# Patient Record
Sex: MALE | Race: BLACK OR AFRICAN AMERICAN | NOT HISPANIC OR LATINO | Employment: FULL TIME | URBAN - METROPOLITAN AREA
[De-identification: names, ages, dates, MRNs, and addresses within clinical notes are randomized per-mention and may not be internally consistent; named-entity substitution may affect disease eponyms.]

---

## 2024-05-18 ENCOUNTER — HOSPITAL ENCOUNTER (INPATIENT)
Facility: HOSPITAL | Age: 59
LOS: 1 days | Discharge: HOME | DRG: 687 | End: 2024-05-21
Attending: EMERGENCY MEDICINE | Admitting: INTERNAL MEDICINE
Payer: COMMERCIAL

## 2024-05-18 DIAGNOSIS — E87.1 HYPONATREMIA: ICD-10-CM

## 2024-05-18 DIAGNOSIS — R07.9 CHEST PAIN: ICD-10-CM

## 2024-05-18 DIAGNOSIS — R30.0 DYSURIA: ICD-10-CM

## 2024-05-18 DIAGNOSIS — N28.89 RENAL MASS, RIGHT: Primary | ICD-10-CM

## 2024-05-18 DIAGNOSIS — R31.9 URINARY TRACT INFECTION WITH HEMATURIA, SITE UNSPECIFIED: ICD-10-CM

## 2024-05-18 DIAGNOSIS — N39.0 URINARY TRACT INFECTION WITH HEMATURIA, SITE UNSPECIFIED: ICD-10-CM

## 2024-05-18 LAB
ALBUMIN SERPL-MCNC: 4 G/DL (ref 3.5–5)
ALBUMIN/GLOB SERPL: 1 RATIO (ref 1.1–2)
ALP SERPL-CCNC: 71 UNIT/L (ref 40–150)
ALT SERPL-CCNC: 25 UNIT/L (ref 0–55)
ANION GAP SERPL CALC-SCNC: 14 MEQ/L
AST SERPL-CCNC: 37 UNIT/L (ref 5–34)
BACTERIA #/AREA URNS AUTO: ABNORMAL /HPF
BASOPHILS # BLD AUTO: 0.16 X10(3)/MCL
BASOPHILS NFR BLD AUTO: 1.4 %
BILIRUB SERPL-MCNC: 0.6 MG/DL
BILIRUB UR QL STRIP.AUTO: NEGATIVE
BUN SERPL-MCNC: 10.2 MG/DL (ref 8.4–25.7)
CALCIUM SERPL-MCNC: 9.2 MG/DL (ref 8.4–10.2)
CHLORIDE SERPL-SCNC: 89 MMOL/L (ref 98–107)
CLARITY UR: ABNORMAL
CO2 SERPL-SCNC: 19 MMOL/L (ref 22–29)
COLOR UR AUTO: ABNORMAL
CREAT SERPL-MCNC: 1.22 MG/DL (ref 0.73–1.18)
CREAT/UREA NIT SERPL: 8
EOSINOPHIL # BLD AUTO: 0.26 X10(3)/MCL (ref 0–0.9)
EOSINOPHIL NFR BLD AUTO: 2.3 %
ERYTHROCYTE [DISTWIDTH] IN BLOOD BY AUTOMATED COUNT: 11.2 % (ref 11.5–17)
GFR SERPLBLD CREATININE-BSD FMLA CKD-EPI: >60 ML/MIN/1.73/M2
GLOBULIN SER-MCNC: 4 GM/DL (ref 2.4–3.5)
GLUCOSE SERPL-MCNC: 84 MG/DL (ref 74–100)
GLUCOSE UR QL STRIP: NORMAL
HCT VFR BLD AUTO: 38.1 % (ref 42–52)
HGB BLD-MCNC: 13.6 G/DL (ref 14–18)
HGB UR QL STRIP: ABNORMAL
IMM GRANULOCYTES # BLD AUTO: 0.03 X10(3)/MCL (ref 0–0.04)
IMM GRANULOCYTES NFR BLD AUTO: 0.3 %
KETONES UR QL STRIP: NEGATIVE
LEUKOCYTE ESTERASE UR QL STRIP: 75
LYMPHOCYTES # BLD AUTO: 2.08 X10(3)/MCL (ref 0.6–4.6)
LYMPHOCYTES NFR BLD AUTO: 18.3 %
MAGNESIUM SERPL-MCNC: 2 MG/DL (ref 1.6–2.6)
MCH RBC QN AUTO: 30.8 PG (ref 27–31)
MCHC RBC AUTO-ENTMCNC: 35.7 G/DL (ref 33–36)
MCV RBC AUTO: 86.2 FL (ref 80–94)
MONOCYTES # BLD AUTO: 1.59 X10(3)/MCL (ref 0.1–1.3)
MONOCYTES NFR BLD AUTO: 14 %
MUCOUS THREADS URNS QL MICRO: ABNORMAL /LPF
NEUTROPHILS # BLD AUTO: 7.25 X10(3)/MCL (ref 2.1–9.2)
NEUTROPHILS NFR BLD AUTO: 63.7 %
NITRITE UR QL STRIP: NEGATIVE
NRBC BLD AUTO-RTO: 0 %
OSMOLALITY SERPL: 269 MOSM/KG (ref 280–300)
PH UR STRIP: 6.5 [PH]
PLATELET # BLD AUTO: 301 X10(3)/MCL (ref 130–400)
PMV BLD AUTO: 9 FL (ref 7.4–10.4)
POTASSIUM SERPL-SCNC: 4.1 MMOL/L (ref 3.5–5.1)
PROT SERPL-MCNC: 8 GM/DL (ref 6.4–8.3)
PROT UR QL STRIP: ABNORMAL
RBC # BLD AUTO: 4.42 X10(6)/MCL (ref 4.7–6.1)
RBC #/AREA URNS AUTO: ABNORMAL /HPF
RENAL EPI CELLS #/AREA UR COMP ASSIST: ABNORMAL /HPF
SODIUM SERPL-SCNC: 122 MMOL/L (ref 136–145)
SP GR UR STRIP.AUTO: 1 (ref 1–1.03)
SQUAMOUS #/AREA URNS LPF: ABNORMAL /HPF
UROBILINOGEN UR STRIP-ACNC: NORMAL
WBC # SPEC AUTO: 11.37 X10(3)/MCL (ref 4.5–11.5)
WBC #/AREA URNS AUTO: ABNORMAL /HPF

## 2024-05-18 PROCEDURE — 96361 HYDRATE IV INFUSION ADD-ON: CPT

## 2024-05-18 PROCEDURE — G0378 HOSPITAL OBSERVATION PER HR: HCPCS

## 2024-05-18 PROCEDURE — 83930 ASSAY OF BLOOD OSMOLALITY: CPT | Performed by: INTERNAL MEDICINE

## 2024-05-18 PROCEDURE — 63600175 PHARM REV CODE 636 W HCPCS: Performed by: INTERNAL MEDICINE

## 2024-05-18 PROCEDURE — 85610 PROTHROMBIN TIME: CPT | Performed by: INTERNAL MEDICINE

## 2024-05-18 PROCEDURE — 96365 THER/PROPH/DIAG IV INF INIT: CPT

## 2024-05-18 PROCEDURE — 83735 ASSAY OF MAGNESIUM: CPT | Performed by: INTERNAL MEDICINE

## 2024-05-18 PROCEDURE — 36415 COLL VENOUS BLD VENIPUNCTURE: CPT | Performed by: INTERNAL MEDICINE

## 2024-05-18 PROCEDURE — 96367 TX/PROPH/DG ADDL SEQ IV INF: CPT

## 2024-05-18 PROCEDURE — 85025 COMPLETE CBC W/AUTO DIFF WBC: CPT

## 2024-05-18 PROCEDURE — 25000003 PHARM REV CODE 250: Performed by: NURSE PRACTITIONER

## 2024-05-18 PROCEDURE — 99285 EMERGENCY DEPT VISIT HI MDM: CPT | Mod: 25

## 2024-05-18 PROCEDURE — 81001 URINALYSIS AUTO W/SCOPE: CPT

## 2024-05-18 PROCEDURE — 85730 THROMBOPLASTIN TIME PARTIAL: CPT | Performed by: INTERNAL MEDICINE

## 2024-05-18 PROCEDURE — 80053 COMPREHEN METABOLIC PANEL: CPT

## 2024-05-18 PROCEDURE — 63600175 PHARM REV CODE 636 W HCPCS: Performed by: NURSE PRACTITIONER

## 2024-05-18 PROCEDURE — 87040 BLOOD CULTURE FOR BACTERIA: CPT | Performed by: NURSE PRACTITIONER

## 2024-05-18 PROCEDURE — 25500020 PHARM REV CODE 255: Performed by: NURSE PRACTITIONER

## 2024-05-18 PROCEDURE — 96375 TX/PRO/DX INJ NEW DRUG ADDON: CPT

## 2024-05-18 PROCEDURE — 87086 URINE CULTURE/COLONY COUNT: CPT

## 2024-05-18 PROCEDURE — 25000003 PHARM REV CODE 250: Performed by: INTERNAL MEDICINE

## 2024-05-18 RX ORDER — POLYETHYLENE GLYCOL 3350 17 G/17G
17 POWDER, FOR SOLUTION ORAL 2 TIMES DAILY PRN
Status: DISCONTINUED | OUTPATIENT
Start: 2024-05-19 | End: 2024-05-21 | Stop reason: HOSPADM

## 2024-05-18 RX ORDER — ALUMINUM HYDROXIDE, MAGNESIUM HYDROXIDE, AND SIMETHICONE 1200; 120; 1200 MG/30ML; MG/30ML; MG/30ML
30 SUSPENSION ORAL 4 TIMES DAILY PRN
Status: DISCONTINUED | OUTPATIENT
Start: 2024-05-19 | End: 2024-05-21 | Stop reason: HOSPADM

## 2024-05-18 RX ORDER — ONDANSETRON HYDROCHLORIDE 2 MG/ML
4 INJECTION, SOLUTION INTRAVENOUS
Status: COMPLETED | OUTPATIENT
Start: 2024-05-18 | End: 2024-05-18

## 2024-05-18 RX ORDER — BENAZEPRIL HYDROCHLORIDE 40 MG/1
40 TABLET ORAL DAILY
Status: ON HOLD | COMMUNITY
Start: 2024-04-24 | End: 2024-05-21 | Stop reason: HOSPADM

## 2024-05-18 RX ORDER — AMLODIPINE BESYLATE 10 MG/1
10 TABLET ORAL DAILY
COMMUNITY
Start: 2024-04-24

## 2024-05-18 RX ORDER — SODIUM CHLORIDE 9 MG/ML
1000 INJECTION, SOLUTION INTRAVENOUS
Status: COMPLETED | OUTPATIENT
Start: 2024-05-18 | End: 2024-05-18

## 2024-05-18 RX ORDER — AMLODIPINE BESYLATE 5 MG/1
10 TABLET ORAL DAILY
Status: DISCONTINUED | OUTPATIENT
Start: 2024-05-19 | End: 2024-05-21 | Stop reason: HOSPADM

## 2024-05-18 RX ORDER — THIAMINE HCL 100 MG
200 TABLET ORAL DAILY
Status: DISCONTINUED | OUTPATIENT
Start: 2024-05-19 | End: 2024-05-21 | Stop reason: HOSPADM

## 2024-05-18 RX ORDER — TALC
6 POWDER (GRAM) TOPICAL NIGHTLY PRN
Status: DISCONTINUED | OUTPATIENT
Start: 2024-05-19 | End: 2024-05-21 | Stop reason: HOSPADM

## 2024-05-18 RX ORDER — ACETAMINOPHEN 325 MG/1
650 TABLET ORAL EVERY 4 HOURS PRN
Status: DISCONTINUED | OUTPATIENT
Start: 2024-05-19 | End: 2024-05-21 | Stop reason: HOSPADM

## 2024-05-18 RX ORDER — FOLIC ACID 1 MG/1
1 TABLET ORAL DAILY
Status: DISCONTINUED | OUTPATIENT
Start: 2024-05-19 | End: 2024-05-21 | Stop reason: HOSPADM

## 2024-05-18 RX ORDER — PROCHLORPERAZINE EDISYLATE 5 MG/ML
5 INJECTION INTRAMUSCULAR; INTRAVENOUS EVERY 6 HOURS PRN
Status: DISCONTINUED | OUTPATIENT
Start: 2024-05-19 | End: 2024-05-21 | Stop reason: HOSPADM

## 2024-05-18 RX ORDER — AMOXICILLIN 250 MG
2 CAPSULE ORAL 2 TIMES DAILY PRN
Status: DISCONTINUED | OUTPATIENT
Start: 2024-05-19 | End: 2024-05-21 | Stop reason: HOSPADM

## 2024-05-18 RX ORDER — SODIUM CHLORIDE 9 MG/ML
INJECTION, SOLUTION INTRAVENOUS CONTINUOUS
Status: DISCONTINUED | OUTPATIENT
Start: 2024-05-18 | End: 2024-05-21 | Stop reason: HOSPADM

## 2024-05-18 RX ORDER — HYOSCYAMINE SULFATE 0.12 MG/1
0.12 TABLET SUBLINGUAL EVERY 4 HOURS PRN
Status: DISCONTINUED | OUTPATIENT
Start: 2024-05-19 | End: 2024-05-21 | Stop reason: HOSPADM

## 2024-05-18 RX ORDER — SODIUM CHLORIDE 0.9 % (FLUSH) 0.9 %
10 SYRINGE (ML) INJECTION
Status: DISCONTINUED | OUTPATIENT
Start: 2024-05-19 | End: 2024-05-21 | Stop reason: HOSPADM

## 2024-05-18 RX ORDER — HYDRALAZINE HYDROCHLORIDE 20 MG/ML
10 INJECTION INTRAMUSCULAR; INTRAVENOUS EVERY 4 HOURS PRN
Status: DISCONTINUED | OUTPATIENT
Start: 2024-05-18 | End: 2024-05-21 | Stop reason: HOSPADM

## 2024-05-18 RX ORDER — HYDROMORPHONE HYDROCHLORIDE 2 MG/ML
1 INJECTION, SOLUTION INTRAMUSCULAR; INTRAVENOUS; SUBCUTANEOUS
Status: COMPLETED | OUTPATIENT
Start: 2024-05-18 | End: 2024-05-18

## 2024-05-18 RX ORDER — CHLORDIAZEPOXIDE HYDROCHLORIDE 25 MG/1
25 CAPSULE, GELATIN COATED ORAL 4 TIMES DAILY PRN
Status: DISCONTINUED | OUTPATIENT
Start: 2024-05-19 | End: 2024-05-21 | Stop reason: HOSPADM

## 2024-05-18 RX ORDER — LISINOPRIL 20 MG/1
40 TABLET ORAL DAILY
Status: DISCONTINUED | OUTPATIENT
Start: 2024-05-19 | End: 2024-05-19

## 2024-05-18 RX ORDER — ONDANSETRON HYDROCHLORIDE 2 MG/ML
4 INJECTION, SOLUTION INTRAVENOUS EVERY 4 HOURS PRN
Status: DISCONTINUED | OUTPATIENT
Start: 2024-05-19 | End: 2024-05-21 | Stop reason: HOSPADM

## 2024-05-18 RX ORDER — GLUCAGON 1 MG
1 KIT INJECTION
Status: DISCONTINUED | OUTPATIENT
Start: 2024-05-19 | End: 2024-05-21 | Stop reason: HOSPADM

## 2024-05-18 RX ORDER — LABETALOL HYDROCHLORIDE 5 MG/ML
10 INJECTION, SOLUTION INTRAVENOUS EVERY 4 HOURS PRN
Status: DISCONTINUED | OUTPATIENT
Start: 2024-05-18 | End: 2024-05-21 | Stop reason: HOSPADM

## 2024-05-18 RX ORDER — IBUPROFEN 200 MG
24 TABLET ORAL
Status: DISCONTINUED | OUTPATIENT
Start: 2024-05-19 | End: 2024-05-21 | Stop reason: HOSPADM

## 2024-05-18 RX ORDER — IBUPROFEN 200 MG
16 TABLET ORAL
Status: DISCONTINUED | OUTPATIENT
Start: 2024-05-19 | End: 2024-05-21 | Stop reason: HOSPADM

## 2024-05-18 RX ADMIN — SODIUM CHLORIDE 1000 ML: 9 INJECTION, SOLUTION INTRAVENOUS at 07:05

## 2024-05-18 RX ADMIN — IOHEXOL 100 ML: 350 INJECTION, SOLUTION INTRAVENOUS at 08:05

## 2024-05-18 RX ADMIN — ONDANSETRON 4 MG: 2 INJECTION INTRAMUSCULAR; INTRAVENOUS at 09:05

## 2024-05-18 RX ADMIN — SODIUM CHLORIDE 1000 ML: 9 INJECTION, SOLUTION INTRAVENOUS at 09:05

## 2024-05-18 RX ADMIN — CEFTRIAXONE SODIUM 1 G: 1 INJECTION, POWDER, FOR SOLUTION INTRAMUSCULAR; INTRAVENOUS at 09:05

## 2024-05-18 RX ADMIN — SODIUM CHLORIDE: 9 INJECTION, SOLUTION INTRAVENOUS at 11:05

## 2024-05-18 RX ADMIN — FOLIC ACID: 5 INJECTION, SOLUTION INTRAMUSCULAR; INTRAVENOUS; SUBCUTANEOUS at 11:05

## 2024-05-18 RX ADMIN — HYDROMORPHONE HYDROCHLORIDE 1 MG: 2 INJECTION INTRAMUSCULAR; INTRAVENOUS; SUBCUTANEOUS at 09:05

## 2024-05-18 RX ADMIN — HYOSCYAMINE SULFATE 0.12 MG: 0.12 TABLET SUBLINGUAL at 11:05

## 2024-05-18 NOTE — ED PROVIDER NOTES
Encounter Date: 5/18/2024       History     Chief Complaint   Patient presents with    Dysuria     Patient reports having dark colored urine this morning, took azo at home. C/o dysuria. Denies any fever at home.      See MDM    The history is provided by the patient. No  was used.     Review of patient's allergies indicates:  No Known Allergies  Past Medical History:   Diagnosis Date    Hypertension      Past Surgical History:   Procedure Laterality Date    APPENDECTOMY       No family history on file.  Social History     Tobacco Use    Smoking status: Never    Smokeless tobacco: Never   Substance Use Topics    Alcohol use: Yes     Comment: almost daily    Drug use: Not Currently     Review of Systems   Constitutional:  Negative for fever.   Respiratory:  Negative for cough and shortness of breath.    Cardiovascular:  Negative for chest pain.   Gastrointestinal:  Negative for abdominal pain.   Genitourinary:  Positive for dysuria. Negative for difficulty urinating.   Musculoskeletal:  Negative for gait problem.   Skin:  Negative for color change.   Neurological:  Negative for dizziness, speech difficulty and headaches.   Psychiatric/Behavioral:  Negative for hallucinations and suicidal ideas.    All other systems reviewed and are negative.      Physical Exam     Initial Vitals [05/18/24 1832]   BP Pulse Resp Temp SpO2   (!) 165/96 90 18 98.3 °F (36.8 °C) 97 %      MAP       --         Physical Exam    Nursing note and vitals reviewed.  Constitutional: He appears well-developed and well-nourished.   HENT:   Head: Normocephalic.   Eyes: EOM are normal.   Neck: Neck supple.   Normal range of motion.  Cardiovascular:  Normal rate, regular rhythm, normal heart sounds and intact distal pulses.           Pulmonary/Chest: Breath sounds normal.   Abdominal: Abdomen is soft. Bowel sounds are normal.   Musculoskeletal:         General: Normal range of motion.      Cervical back: Normal range of motion and  neck supple.     Neurological: He is alert and oriented to person, place, and time. He has normal strength.   Skin: Skin is warm and dry. Capillary refill takes less than 2 seconds.   Psychiatric: He has a normal mood and affect. His behavior is normal. Judgment and thought content normal.         ED Course   Procedures  Labs Reviewed   URINALYSIS, REFLEX TO URINE CULTURE - Abnormal; Notable for the following components:       Result Value    Color, UA Dark-Brown (*)     Appearance, UA Turbid (*)     Specific Gravity, UA 1.004 (*)     Protein, UA 2+ (*)     Blood, UA 3+ (*)     Leukocyte Esterase, UA 75 (*)     WBC, UA 11-20 (*)     Bacteria, UA Occasional (*)     Renal Epithelial Cells, UA Rare (*)     Mucous, UA Trace (*)     RBC, UA 50-99 (*)     All other components within normal limits   COMPREHENSIVE METABOLIC PANEL - Abnormal; Notable for the following components:    Sodium 122 (*)     Chloride 89 (*)     CO2 19 (*)     Creatinine 1.22 (*)     Globulin 4.0 (*)     Albumin/Globulin Ratio 1.0 (*)     AST 37 (*)     All other components within normal limits   CBC WITH DIFFERENTIAL - Abnormal; Notable for the following components:    RBC 4.42 (*)     Hgb 13.6 (*)     Hct 38.1 (*)     RDW 11.2 (*)     Mono # 1.59 (*)     All other components within normal limits   CULTURE, URINE   BLOOD CULTURE [facility]   BLOOD CULTURE [facility]   CBC W/ AUTO DIFFERENTIAL    Narrative:     The following orders were created for panel order CBC auto differential.  Procedure                               Abnormality         Status                     ---------                               -----------         ------                     CBC with Differential[[id number]]       Abnormal            Final result                 Please view results for these tests on the individual orders.          Imaging Results              CT Abdomen Pelvis With IV Contrast NO Oral Contrast (Final result)  Result time 05/18/24 20:24:54      Final result by  Juan Jose Ruffin MD (05/18/24 20:24:54)                   Impression:      1. Right renal large heterogeneous mass is consistent with renal cell carcinoma.  Surgical consultation is recommended.    2. Right mild hydronephrosis and hydroureter without renal ureteral lumen occluding      Electronically signed by: Juan Jose Ruffin  Date:    05/18/2024  Time:    20:24               Narrative:    EXAMINATION:  CT ABDOMEN PELVIS WITH IV CONTRAST    CLINICAL HISTORY:  lower abdominal pain;    TECHNIQUE:  Multidetector axial images were obtained of the abdomen and pelvis following the administration of IV contrast. Oral contrast was not administered.    Dose length product of 949 mGycm.  Automated exposure control was utilized to minimize radiation dose.    COMPARISON:  None available    FINDINGS:  Included portion of the lungs are without suspicious nodularity, acute air space infiltrates or fluid within the pleural spaces.  There is minimal atelectasis scarring involving the lingular segment.  Visualized cardiac chambers are enlarged in size.    Liver is remarkable steatosis without focal space occupying lesion..  Gallbladder wall is not thickened and there is no intra luminal calcified calculus. No biliary dilation identified. Pancreatic unremarkable attenuation without acute peripancreatic phlegmons. Main pancreatic duct is not dilated. Spleen is of normal size without focal lesion. The adrenal glands appear within normal limits.    Right kidney is remarkable for peripheral aspect exophytic large heterogeneously enhancing mass with both solid and cystic necrotic component and shows anterior-posterior diameter 10.9 cm, transverse diameter of 9.6 cm and a maximum length of 11.8 cm on image 62 series 2, image 75 series 4 and image 108 series 5.  The mass is surrounded by inflammatory strandings.  This mass encroaches through the cortex and reaches the right renal pelvis which is slightly dilated.  This is consistent with renal  cell carcinoma.  There is no suggestion of tumor thrombus within the right renal vein and the inferior vena cava appears to be patent.  There are subcentimeter retroperitoneal periaortic lymph nodes.  There is mild dilatation of the right ureter with slight periureteral inflammatory strandings.  No renal or ureteral lumen occluding calculus identified.  There is diffusely thickened urinary bladder wall.  No intravesical calculus.  Left kidney is unremarkable with exception of minimal nonspecific perinephric strandings.    Small hiatal hernia.  Distal esophageal mural thickening probably is the result of reflux disease.  Stomach is mostly decompressed.  There is no abnormal dilatation of the small bowel loops and there is no focal or generalized mural thickening.  Colon is nondistended and there are no pericolonic acute strandings.  No bowel obstruction.  No free fluid.    There are degenerate arthritic changes involving the sacroiliac joints.  No lytic destructive or sclerotic osseous metastatic lesions.  No acute or otherwise osseous abnormality identified.                                       Medications   cefTRIAXone (Rocephin) 1 g in dextrose 5 % in water (D5W) 100 mL IVPB (MB+) (has no administration in time range)   sodium chloride 0.9% bolus 1,000 mL 1,000 mL (0 mLs Intravenous Stopped 5/18/24 2119)   iohexoL (OMNIPAQUE 350) injection 100 mL (100 mLs Intravenous Given 5/18/24 2013)   HYDROmorphone (PF) injection 1 mg (1 mg Intravenous Given 5/18/24 2122)   ondansetron injection 4 mg (4 mg Intravenous Given 5/18/24 2114)   0.9%  NaCl infusion (1,000 mLs Intravenous New Bag 5/18/24 2121)     Medical Decision Making  Historian:  Patient.  Patient is a 58-year-old male  that presents with dark urine and dysuria that has been present yesterday. Associated symptoms nothing. Surrounding information is nothing. Exacerbated by nothing. Relieved by nothing. Patient treatment prior to arrival none. Risk factors include  none. Other history pertaining to this complaint none.   Assessment:  See physical exam.  DD:  UTI, hemorrhagic cystitis, urobilinogen  ED Course: History was obtained.  Physical was performed.  Patient came in with hematuria and dysuria.  This appears to be a renal mass.  I did get patient admitted with Hospital Medicine. Medical or surgical consults:  Hospital Medicine. Social determinants that affect healthcare:  None.       Amount and/or Complexity of Data Reviewed  External Data Reviewed: notes.     Details: Reviewed outside visit from 06/30/2021  Labs:      Details: Lab studies shows low sodium in mild elevation of creatinine.  Urine does show white blood cells, some renal epithelial cells, and red blood cells.  Radiology: ordered.     Details: CT does show what looks like renal carcinoma  Discussion of management or test interpretation with external provider(s): I did speak to Cindy, nurse practitioner with Hospital Medicine, and they accepted the admission.  She would like blood cultures and Rocephin started.  I discussed this case with Dr. Herrera, emergency room physician, and he agrees with this plan of care and performed a face-to-face interaction with patient.     Risk  Decision regarding hospitalization.                                      Clinical Impression:  Final diagnoses:  [N28.89] Renal mass, right (Primary)  [R30.0] Dysuria  [N39.0, R31.9] Urinary tract infection with hematuria, site unspecified          ED Disposition Condition    Observation Stable                Dany Garduno, YOJANA  05/18/24 2121

## 2024-05-19 PROBLEM — E87.1 HYPONATREMIA: Status: ACTIVE | Noted: 2024-05-19

## 2024-05-19 LAB
ALBUMIN SERPL-MCNC: 3.7 G/DL (ref 3.5–5)
ALBUMIN/GLOB SERPL: 1.1 RATIO (ref 1.1–2)
ALLENS TEST BLOOD GAS (OHS): YES
ALP SERPL-CCNC: 73 UNIT/L (ref 40–150)
ALT SERPL-CCNC: 22 UNIT/L (ref 0–55)
ANION GAP SERPL CALC-SCNC: 13 MEQ/L
APTT PPP: 33.9 SECONDS (ref 23.2–33.7)
AST SERPL-CCNC: 30 UNIT/L (ref 5–34)
BASE EXCESS BLD CALC-SCNC: -3.3 MMOL/L (ref -2–2)
BILIRUB SERPL-MCNC: 0.8 MG/DL
BLOOD GAS SAMPLE TYPE (OHS): ABNORMAL
BUN SERPL-MCNC: 8.3 MG/DL (ref 8.4–25.7)
CA-I BLD-SCNC: 1.03 MMOL/L (ref 1.12–1.23)
CALCIUM SERPL-MCNC: 8.4 MG/DL (ref 8.4–10.2)
CHLORIDE SERPL-SCNC: 89 MMOL/L (ref 98–107)
CO2 BLDA-SCNC: 21.1 MMOL/L
CO2 SERPL-SCNC: 18 MMOL/L (ref 22–29)
COHGB MFR BLDA: 1 % (ref 0.5–1.5)
CREAT SERPL-MCNC: 1.32 MG/DL (ref 0.73–1.18)
CREAT/UREA NIT SERPL: 6
DRAWN BY BLOOD GAS (OHS): ABNORMAL
ERYTHROCYTE [DISTWIDTH] IN BLOOD BY AUTOMATED COUNT: 11.2 % (ref 11.5–17)
GFR SERPLBLD CREATININE-BSD FMLA CKD-EPI: >60 ML/MIN/1.73/M2
GLOBULIN SER-MCNC: 3.3 GM/DL (ref 2.4–3.5)
GLUCOSE SERPL-MCNC: 134 MG/DL (ref 74–100)
HCO3 BLDA-SCNC: 20.1 MMOL/L (ref 22–26)
HCT VFR BLD AUTO: 36.1 % (ref 42–52)
HGB BLD-MCNC: 13.1 G/DL (ref 14–18)
INR PPP: 1
MAGNESIUM SERPL-MCNC: 2.6 MG/DL (ref 1.6–2.6)
MCH RBC QN AUTO: 31.3 PG (ref 27–31)
MCHC RBC AUTO-ENTMCNC: 36.3 G/DL (ref 33–36)
MCV RBC AUTO: 86.2 FL (ref 80–94)
METHGB MFR BLDA: 1.2 % (ref 0.4–1.5)
NRBC BLD AUTO-RTO: 0 %
O2 HB BLOOD GAS (OHS): 94.9 % (ref 94–97)
OSMOLALITY UR: 264 MOSM/KG (ref 300–1300)
OXYHGB MFR BLDA: 14.1 G/DL (ref 12–16)
PCO2 BLDA: 31 MMHG (ref 35–45)
PH BLDA: 7.42 [PH] (ref 7.35–7.45)
PHOSPHATE SERPL-MCNC: 1.9 MG/DL (ref 2.3–4.7)
PLATELET # BLD AUTO: 278 X10(3)/MCL (ref 130–400)
PMV BLD AUTO: 9.2 FL (ref 7.4–10.4)
PO2 BLDA: 86 MMHG (ref 80–100)
POTASSIUM BLOOD GAS (OHS): 4.3 MMOL/L (ref 3.5–5)
POTASSIUM SERPL-SCNC: 4.4 MMOL/L (ref 3.5–5.1)
PROT SERPL-MCNC: 7 GM/DL (ref 6.4–8.3)
PROTHROMBIN TIME: 13.3 SECONDS (ref 12.5–14.5)
RBC # BLD AUTO: 4.19 X10(6)/MCL (ref 4.7–6.1)
SAMPLE SITE BLOOD GAS (OHS): ABNORMAL
SAO2 % BLDA: 96.7 %
SODIUM BLOOD GAS (OHS): 110 MMOL/L (ref 137–145)
SODIUM SERPL-SCNC: 117 MMOL/L (ref 136–145)
SODIUM SERPL-SCNC: 120 MMOL/L (ref 136–145)
SODIUM SERPL-SCNC: 121 MMOL/L (ref 136–145)
SODIUM UR-SCNC: 91 MMOL/L
WBC # SPEC AUTO: 12.57 X10(3)/MCL (ref 4.5–11.5)

## 2024-05-19 PROCEDURE — 85027 COMPLETE CBC AUTOMATED: CPT | Performed by: INTERNAL MEDICINE

## 2024-05-19 PROCEDURE — 83735 ASSAY OF MAGNESIUM: CPT | Performed by: INTERNAL MEDICINE

## 2024-05-19 PROCEDURE — 25000003 PHARM REV CODE 250: Performed by: INTERNAL MEDICINE

## 2024-05-19 PROCEDURE — 99205 OFFICE O/P NEW HI 60 MIN: CPT | Mod: ,,, | Performed by: INTERNAL MEDICINE

## 2024-05-19 PROCEDURE — 25500020 PHARM REV CODE 255: Performed by: INTERNAL MEDICINE

## 2024-05-19 PROCEDURE — 96366 THER/PROPH/DIAG IV INF ADDON: CPT

## 2024-05-19 PROCEDURE — 36600 WITHDRAWAL OF ARTERIAL BLOOD: CPT

## 2024-05-19 PROCEDURE — G0378 HOSPITAL OBSERVATION PER HR: HCPCS

## 2024-05-19 PROCEDURE — 80053 COMPREHEN METABOLIC PANEL: CPT | Performed by: INTERNAL MEDICINE

## 2024-05-19 PROCEDURE — 83935 ASSAY OF URINE OSMOLALITY: CPT | Performed by: INTERNAL MEDICINE

## 2024-05-19 PROCEDURE — 82803 BLOOD GASES ANY COMBINATION: CPT

## 2024-05-19 PROCEDURE — 36415 COLL VENOUS BLD VENIPUNCTURE: CPT | Performed by: INTERNAL MEDICINE

## 2024-05-19 PROCEDURE — 99900035 HC TECH TIME PER 15 MIN (STAT)

## 2024-05-19 PROCEDURE — 96361 HYDRATE IV INFUSION ADD-ON: CPT

## 2024-05-19 PROCEDURE — 99204 OFFICE O/P NEW MOD 45 MIN: CPT | Mod: ,,, | Performed by: INTERNAL MEDICINE

## 2024-05-19 PROCEDURE — 63600175 PHARM REV CODE 636 W HCPCS: Performed by: INTERNAL MEDICINE

## 2024-05-19 PROCEDURE — 84295 ASSAY OF SERUM SODIUM: CPT | Mod: 91 | Performed by: INTERNAL MEDICINE

## 2024-05-19 PROCEDURE — 84100 ASSAY OF PHOSPHORUS: CPT | Performed by: INTERNAL MEDICINE

## 2024-05-19 PROCEDURE — 84300 ASSAY OF URINE SODIUM: CPT | Performed by: INTERNAL MEDICINE

## 2024-05-19 RX ORDER — SODIUM CHLORIDE 1 G/1
1000 TABLET ORAL 2 TIMES DAILY
Status: DISCONTINUED | OUTPATIENT
Start: 2024-05-19 | End: 2024-05-21 | Stop reason: HOSPADM

## 2024-05-19 RX ORDER — HYDROCODONE BITARTRATE AND ACETAMINOPHEN 5; 325 MG/1; MG/1
1 TABLET ORAL EVERY 4 HOURS PRN
Status: DISCONTINUED | OUTPATIENT
Start: 2024-05-19 | End: 2024-05-21 | Stop reason: HOSPADM

## 2024-05-19 RX ORDER — HYDRALAZINE HYDROCHLORIDE 25 MG/1
25 TABLET, FILM COATED ORAL EVERY 8 HOURS
Status: DISCONTINUED | OUTPATIENT
Start: 2024-05-19 | End: 2024-05-21 | Stop reason: HOSPADM

## 2024-05-19 RX ORDER — SODIUM,POTASSIUM PHOSPHATES 280-250MG
1 POWDER IN PACKET (EA) ORAL 4 TIMES DAILY
Status: DISPENSED | OUTPATIENT
Start: 2024-05-19 | End: 2024-05-21

## 2024-05-19 RX ORDER — LANOLIN ALCOHOL/MO/W.PET/CERES
400 CREAM (GRAM) TOPICAL 2 TIMES DAILY
Status: DISCONTINUED | OUTPATIENT
Start: 2024-05-20 | End: 2024-05-21 | Stop reason: HOSPADM

## 2024-05-19 RX ORDER — TOLVAPTAN 15 MG/1
30 TABLET ORAL ONCE
Status: COMPLETED | OUTPATIENT
Start: 2024-05-19 | End: 2024-05-19

## 2024-05-19 RX ADMIN — TOLVAPTAN 30 MG: 15 TABLET ORAL at 11:05

## 2024-05-19 RX ADMIN — HYOSCYAMINE SULFATE 0.12 MG: 0.12 TABLET SUBLINGUAL at 12:05

## 2024-05-19 RX ADMIN — POTASSIUM & SODIUM PHOSPHATES POWDER PACK 280-160-250 MG 1 PACKET: 280-160-250 PACK at 08:05

## 2024-05-19 RX ADMIN — HYOSCYAMINE SULFATE 0.12 MG: 0.12 TABLET SUBLINGUAL at 08:05

## 2024-05-19 RX ADMIN — POTASSIUM & SODIUM PHOSPHATES POWDER PACK 280-160-250 MG 1 PACKET: 280-160-250 PACK at 10:05

## 2024-05-19 RX ADMIN — AMLODIPINE BESYLATE 10 MG: 5 TABLET ORAL at 08:05

## 2024-05-19 RX ADMIN — POTASSIUM & SODIUM PHOSPHATES POWDER PACK 280-160-250 MG 1 PACKET: 280-160-250 PACK at 12:05

## 2024-05-19 RX ADMIN — SODIUM CHLORIDE: 9 INJECTION, SOLUTION INTRAVENOUS at 10:05

## 2024-05-19 RX ADMIN — HYDROCODONE BITARTRATE AND ACETAMINOPHEN 1 TABLET: 5; 325 TABLET ORAL at 10:05

## 2024-05-19 RX ADMIN — LISINOPRIL 40 MG: 20 TABLET ORAL at 08:05

## 2024-05-19 RX ADMIN — IOHEXOL 85 ML: 350 INJECTION, SOLUTION INTRAVENOUS at 12:05

## 2024-05-19 RX ADMIN — HYDROCODONE BITARTRATE AND ACETAMINOPHEN 1 TABLET: 5; 325 TABLET ORAL at 02:05

## 2024-05-19 RX ADMIN — THIAMINE HCL TAB 100 MG 200 MG: 100 TAB at 08:05

## 2024-05-19 RX ADMIN — HYOSCYAMINE SULFATE 0.12 MG: 0.12 TABLET SUBLINGUAL at 04:05

## 2024-05-19 RX ADMIN — HYDROCODONE BITARTRATE AND ACETAMINOPHEN 1 TABLET: 5; 325 TABLET ORAL at 06:05

## 2024-05-19 RX ADMIN — ACETAMINOPHEN 650 MG: 325 TABLET, FILM COATED ORAL at 08:05

## 2024-05-19 RX ADMIN — HYDRALAZINE HYDROCHLORIDE 25 MG: 25 TABLET, FILM COATED ORAL at 09:05

## 2024-05-19 RX ADMIN — THERA TABS 1 TABLET: TAB at 08:05

## 2024-05-19 RX ADMIN — SODIUM CHLORIDE 1000 MG: 1 TABLET ORAL at 08:05

## 2024-05-19 RX ADMIN — CEFTRIAXONE SODIUM 1 G: 1 INJECTION, POWDER, FOR SOLUTION INTRAMUSCULAR; INTRAVENOUS at 11:05

## 2024-05-19 RX ADMIN — FOLIC ACID 1 MG: 1 TABLET ORAL at 08:05

## 2024-05-19 RX ADMIN — Medication 6 MG: at 04:05

## 2024-05-19 RX ADMIN — SODIUM CHLORIDE 1000 MG: 1 TABLET ORAL at 11:05

## 2024-05-19 RX ADMIN — POTASSIUM & SODIUM PHOSPHATES POWDER PACK 280-160-250 MG 1 PACKET: 280-160-250 PACK at 04:05

## 2024-05-19 RX ADMIN — HYDROCODONE BITARTRATE AND ACETAMINOPHEN 1 TABLET: 5; 325 TABLET ORAL at 11:05

## 2024-05-19 NOTE — PLAN OF CARE
Problem: Adult Inpatient Plan of Care  Goal: Plan of Care Review  Outcome: Progressing  Flowsheets (Taken 5/18/2024 2258)  Plan of Care Reviewed With: patient  Goal: Patient-Specific Goal (Individualized)  Outcome: Progressing  Goal: Absence of Hospital-Acquired Illness or Injury  Outcome: Progressing  Intervention: Identify and Manage Fall Risk  Flowsheets (Taken 5/18/2024 2258)  Safety Promotion/Fall Prevention:   assistive device/personal item within reach   Fall Risk reviewed with patient/family   lighting adjusted   medications reviewed   nonskid shoes/socks when out of bed   side rails raised x 2   commode/urinal/bedpan at bedside  Intervention: Prevent Skin Injury  Flowsheets (Taken 5/18/2024 2258)  Body Position:   sitting up in bed   position changed independently  Device Skin Pressure Protection: tubing/devices free from skin contact  Intervention: Prevent and Manage VTE (Venous Thromboembolism) Risk  Flowsheets (Taken 5/18/2024 2258)  VTE Prevention/Management:   ambulation promoted   intravenous hydration  Intervention: Prevent Infection  Flowsheets (Taken 5/18/2024 2258)  Infection Prevention:   environmental surveillance performed   equipment surfaces disinfected   hand hygiene promoted   personal protective equipment utilized   rest/sleep promoted   single patient room provided  Goal: Optimal Comfort and Wellbeing  Outcome: Progressing  Intervention: Monitor Pain and Promote Comfort  Flowsheets (Taken 5/18/2024 2258)  Pain Management Interventions:   care clustered   quiet environment facilitated  Intervention: Provide Person-Centered Care  Flowsheets (Taken 5/18/2024 2258)  Trust Relationship/Rapport:   care explained   choices provided   emotional support provided   empathic listening provided   thoughts/feelings acknowledged   reassurance provided   questions encouraged   questions answered  Goal: Readiness for Transition of Care  Outcome: Progressing

## 2024-05-19 NOTE — CONSULTS
[facility]                       [address] [place] [id number]    PATIENT NAME:       ANKUR JUNE  YOB: 1965  CSN:                [id number]   MRN:                [id number]  ADMIT DATE:         05/18/2024 18:34:00  PHYSICIAN:          Rober Pena MD                            CONSULTATION    DATE OF CONSULT:  5/19/2024    HISTORY OF PRESENT ILLNESS:  The patient is a pleasant 58-year-old gentleman   (nonsmoker), who presents to [facility] after having several weeks of   gross hematuria and dysuria.  The patient was taking Azo at home with   improvement but symptoms became worse.  He has vague right flank discomfort.  A   CT scan was performed, which documents very large right renal mass measuring   10.9 x 9.6 x 11.8, mass surrounded by inflammatory changes.  There is necrosis.    The mass encroaches through the cortex and reaches the right renal pelvis.  On   this contrasted scan, there does not appear to be renal vein involvement.  Vena   cava appears patent.  There is mild dilatation of the right ureter with   periureteral inflammatory stranding.    PAST MEDICAL HISTORY/PAST SURGICAL HISTORY:  Appendectomy.    SOCIAL HISTORY:  Nonsmoker.    PHYSICAL EXAMINATION:  VITAL SIGNS:  Patient is afebrile.  Vital signs are stable.  GENERAL:  Uncomfortable appearing but in no acute distress.  ABDOMEN:  Protuberant, soft, nontender, nondistended.    LABORATORY DATA:  Urinalysis shows red cells.  BUN and creatinine 19 and 1.2.  H   and H are stable    ASSESSMENT AND PLAN:  Right renal mass.  1. The patient will ultimately require right nephrectomy.  This can be performed   robotically.  The CT scan shows no renal vein or inferior vena caval   involvement of tumor thrombus but I think it would make sense to obtain an   MRA/MRV to make clear that the vein is patent.  2. The patient should have a chest  CT.  3. Oncology evaluation while here in the hospital.        ______________________________  MD LESLEY Maciel/ANNA  DD:  05/19/2024  Time:  07:58AM  DT:  05/19/2024  Time:  08:32AM  Job #:  [id number]      CONSULTATION

## 2024-05-19 NOTE — NURSING
Nurses Note -- 4 Eyes      5/18/2024   10:56 PM      Skin assessed during: Admit      [x] No Altered Skin Integrity Present    [x]Prevention Measures Documented      [] Yes- Altered Skin Integrity Present or Discovered   [] LDA Added if Not in Epic (Describe Wound)   [] New Altered Skin Integrity was Present on Admit and Documented in LDA   [] Wound Image Taken    Wound Care Consulted? No    Attending Nurse:  Meghana Washburn RN    Second RN/Staff Member:   Ian Murray RN

## 2024-05-19 NOTE — H&P
[facility] Hospital Medicine - H&P Note    Patient Name: Elan Lentz Jr.  MRN: [id number]  PCP: No, Primary Doctor  Admitting Physician: Magda Saucedo MD  Admission Class: OP- Observation   Date of Service: 05/18/2024  Code status: Full    Chief Complaint   Burning urination, suprapubic pain, bloody urine    History of Present Illness   This is a 58-year-old male with medical history of hypertension, 4-5 beers daily alcohol consumption present to the ED with complaint of suprapubic pain, burning urination and hematuria for 2 days.  Reports similar episode few months back and took Pyridium in his symptom resolved.  No other episode of hematuria until now.  Denies weight loss or night sweats.    On arrival to ED he was afebrile, hypertensive and saturating 97% on room air.  Labs notable for WBC 11.37, hemoglobin 13.6, platelets 301, INR 1.0, normal PTT, sodium 122, chloride 89, CO2 19, creatinine 1.22, BUN 10.2, normal total bilirubin, AST 37, albumin 4.0, urinalysis show hematuria with 50-99 RBC, 11-20 WBC and occasional bacteria no nitrites.    CT abdomen pelvis show right renal large heterogeneous mass measuring 10.9 x 9.6 x 11.8 cm consistent with renal cell carcinoma, the mass and rounded by inflammatory stranding, and encroaches through the cortex and reach the right renal pelvis which is slightly dilated, mild dilation of the right ureter and slight periureteral inflammation No tumor thrombus in the right renal vein or IVC, subcentimeter retroperitoneal and periaortic lymph nodes, urinary bladder wall is diffusely thickened    He was given 2 L of normal saline, ceftriaxone, analgesics and referred to hospital medicine service for further evaluation and management.    ROS   Except as documented, all other systems reviewed and negative     Past Medical History   Hypertension    Surgical History   Appendectomy    Social History   Denies smoking   Drinks alcohol 4-5 beers  daily   at [employer]    Screening for Social Drivers of Health:  Patient screened for food insecurity, housing instability, transportation needs, utility difficulties, and interpersonal safety:  []Housing or food  []Transportation needs  []Utility difficulties  []Interpersonal safety  [x]None    Family History   Reviewed and on unaware of any relatives with malignancy history    Allergies   Patient has no known allergies.    Home Medications     Prior to Admission medications    Medication Sig Start Date End Date Taking? Authorizing Provider   amLODIPine (NORVASC) 10 MG tablet Take 10 mg by mouth once daily. 4/24/24  Yes Provider, Historical   benazepriL (LOTENSIN) 40 MG tablet Take 40 mg by mouth once daily. 4/24/24  Yes Provider, Historical       Physical Exam   Vital Signs  Temp:  [97.5 °F (36.4 °C)-98.3 °F (36.8 °C)]   Pulse:  [65-90]   Resp:  [15-18]   BP: (102-165)/(65-96)   SpO2:  [93 %-97 %]    General: Appears comfortable  HEENT: NC/AT  Neck:  No JVD  Chest: CTABL  CVS: Regular rhythm. Normal S1/S2.  Abdomen: nondistended, normoactive BS, soft , suprapubic and right flank tenderness  MSK: No obvious deformity or joint swelling  Skin: Warm and dry  Neuro: AAOx3, no focal neurological deficit  Psych: Cooperative    Labs     Recent Labs     05/18/24  1847   WBC 11.37   RBC 4.42*   HGB 13.6*   HCT 38.1*   MCV 86.2   MCH 30.8   MCHC 35.7   RDW 11.2*           Recent Labs     05/18/24  1847   *   K 4.1   CHLORIDE 89*   CO2 19*   BUN 10.2   CREATININE 1.22*   EGFRNORACEVR >60   GLUCOSE 84   CALCIUM 9.2   ALBUMIN 4.0   GLOBULIN 4.0*   ALKPHOS 71   ALT 25   AST 37*   BILITOT 0.6        Microbiology Results (last 7 days)       Procedure Component Value Units Date/Time    Blood culture #1 **CANNOT BE ORDERED STAT** [[id number]] Collected: 05/18/24 2132    Order Status: Resulted Specimen: Blood Updated: 05/18/24 2200    Blood culture #2 **CANNOT BE ORDERED STAT** [[id number]] Collected:  05/18/24 2132    Order Status: Resulted Specimen: Blood Updated: 05/18/24 2148    Urine culture [[id number]] Collected: 05/18/24 1840    Order Status: Sent Specimen: Urine Updated: 05/18/24 1859           Imaging     CT Abdomen Pelvis With IV Contrast NO Oral Contrast   Final Result      1. Right renal large heterogeneous mass is consistent with renal cell carcinoma.  Surgical consultation is recommended.      2. Right mild hydronephrosis and hydroureter without renal ureteral lumen occluding         Electronically signed by: Juan Jose Ruffin   Date:    05/18/2024   Time:    20:24        Assessment   Large right renal mass consistent with RCC  Acute UTI  Cystitis and possible right ureteritis & pyelonephritis  Hematuria secondary to renal mass and UTI  Hyponatremia  Suspect beer potomania  FRED versus CKD2  Hypertension, uncontrolled  Heavy alcohol use/beer      Plan   Blood cultures x2, urine culture, continue ceftriaxone  Urine cytology  Normal saline at 100 mL/hours  Check urine sodium and urine and serum osmolarity  Monitor sodium Q6H and adjust fluids/intervention accordingly  Banana bag+ thiamine 500 mg+ Mg sulfate 4 g x1  CIWA assessment, prn Librium, PRN Ativan for seizures, MV/FA/thiamine p.o. daily  Repeat CBC, CMP, MG, Phos in a.m.  Resume amlodipine and ACE inhibitor  Urology consult  VTE Prophylaxis: SCDs     Critical care time:  35 minutes  Critical care diagnosis:  Hyponatremia    Magda Saucedo MD  Internal Medicine  5/18/2024. at 11:19 PM.

## 2024-05-19 NOTE — CONSULTS
[facility] - Oncology Acute  Hematology/Oncology  Consult Note    Patient Name: Elan Lentz Jr.  MRN: [id number]  Admission Date: 5/18/2024  Hospital Length of Stay: 0 days  Attending Provider: Anahy Kidd MD  Consulting Provider: Bibiana Schumacher MD  Principal Problem:Renal mass, right    Inpatient consult to Oncology  Consult performed by: Bibiana Schumacher MD  Consult ordered by: Rober Pena MD        Subjective:     HPI: 59 yo male, presented to [facility] with h/o several weeks of gross hematuria and dysuria. He was taking Azo at home w/o relief. He denies any pain but did endorse some right flank discomfort. CT A/P w/ contrast done 5/17/24 showed large right renal mass measuring 10.9X9.6X11.8cm encroaches through renal cortex and right renal pelvis, causes mild right hydronephrosis and hydroureter, likely c/w renal cell carcinoma. No tumor thrombus. Patient admitted to hospital. Labs remarkable for hyponatremia and patient did report drinking large quantities of beer. Patient seen by urologist Dr. Pena. CT chest ordered and surgery will likely be planned with robotic assisted laparoscopic right nephrectomy. Oncology consulted for evaluation as well.     Patient seen and examined. No new complaints reported. Explained further recommendations pending work-up and pathology from surgery.     Oncology Treatment Plan: TBD    Medications:  Continuous Infusions:   sodium chloride 0.9%   Intravenous Continuous 100 mL/hr at 05/19/24 0525 Rate Verify at 05/19/24 0525     Scheduled Meds:   amLODIPine  10 mg Oral Daily    cefTRIAXone (Rocephin) IV (PEDS and ADULTS)  1 g Intravenous Q24H    folic acid  1 mg Oral Daily    lisinopriL  40 mg Oral Daily    multivitamin  1 tablet Oral Daily    thiamine  200 mg Oral Daily     PRN Meds:  Current Facility-Administered Medications:     acetaminophen, 650 mg, Oral, Q4H PRN    aluminum-magnesium hydroxide-simethicone, 30 mL, Oral, QID PRN     chlordiazepoxide, 25 mg, Oral, QID PRN    dextrose 10%, 12.5 g, Intravenous, PRN    dextrose 10%, 25 g, Intravenous, PRN    glucagon (human recombinant), 1 mg, Intramuscular, PRN    glucose, 16 g, Oral, PRN    glucose, 24 g, Oral, PRN    hydrALAZINE, 10 mg, Intravenous, Q4H PRN    HYDROcodone-acetaminophen, 1 tablet, Oral, Q4H PRN    hyoscyamine, 0.125 mg, Sublingual, Q4H PRN    labetalol, 10 mg, Intravenous, Q4H PRN    lorazepam, 2 mg, Intravenous, Q15 Min PRN    melatonin, 6 mg, Oral, Nightly PRN    ondansetron, 4 mg, Intravenous, Q4H PRN    polyethylene glycol, 17 g, Oral, BID PRN    prochlorperazine, 5 mg, Intravenous, Q6H PRN    senna-docusate 8.6-50 mg, 2 tablet, Oral, BID PRN    sodium chloride 0.9%, 10 mL, Intravenous, PRN     Review of patient's allergies indicates:  No Known Allergies     Past Medical History:   Diagnosis Date    Hypertension      Past Surgical History:   Procedure Laterality Date    APPENDECTOMY       Family History    None       Tobacco Use    Smoking status: Never    Smokeless tobacco: Never   Substance and Sexual Activity    Alcohol use: Yes     Comment: almost daily    Drug use: Not Currently    Sexual activity: Not on file       Review of Systems   All other systems reviewed and are negative.    Objective:     Vital Signs (Most Recent):  Temp: 97.8 °F (36.6 °C) (05/19/24 0748)  Pulse: 73 (05/19/24 0748)  Resp: 19 (05/19/24 0607)  BP: (!) 186/95 (05/19/24 0748)  SpO2: 95 % (05/19/24 0748) Vital Signs (24h Range):  Temp:  [97.5 °F (36.4 °C)-98.3 °F (36.8 °C)] 97.8 °F (36.6 °C)  Pulse:  [65-90] 73  Resp:  [15-19] 19  SpO2:  [93 %-98 %] 95 %  BP: (102-186)/(65-96) 186/95     Weight: 97.5 kg (215 lb)  Body mass index is 34.7 kg/m².  Body surface area is 2.13 meters squared.      Intake/Output Summary (Last 24 hours) at 5/19/2024 0927  Last data filed at 5/19/2024 0611  Gross per 24 hour   Intake 3517.75 ml   Output 2880 ml   Net 637.75 ml       Physical Exam  Constitutional:        General: He is awake.      Appearance: Normal appearance.   HENT:      Head: Normocephalic and atraumatic.      Nose: Nose normal.      Mouth/Throat:      Mouth: Mucous membranes are moist.   Eyes:      General: Vision grossly intact.      Extraocular Movements: Extraocular movements intact.      Conjunctiva/sclera: Conjunctivae normal.   Cardiovascular:      Rate and Rhythm: Normal rate and regular rhythm.      Heart sounds: Normal heart sounds.   Pulmonary:      Effort: Pulmonary effort is normal.      Breath sounds: Normal breath sounds.   Abdominal:      General: Bowel sounds are normal. There is no distension.      Palpations: Abdomen is soft.      Tenderness: There is no abdominal tenderness.   Musculoskeletal:      Cervical back: Normal range of motion and neck supple.      Right lower leg: No edema.      Left lower leg: No edema.   Lymphadenopathy:      Cervical: No cervical adenopathy.      Upper Body:      Right upper body: No supraclavicular adenopathy.      Left upper body: No supraclavicular adenopathy.   Skin:     General: Skin is warm.   Neurological:      Mental Status: He is alert and oriented to person, place, and time.      Motor: Motor function is intact.   Psychiatric:         Mood and Affect: Mood normal.         Speech: Speech normal.         Behavior: Behavior is cooperative.         Judgment: Judgment normal.         Significant Labs:   CBC:   Recent Labs   Lab 05/18/24  1847 05/19/24  0644   WBC 11.37 12.57*   HGB 13.6* 13.1*   HCT 38.1* 36.1*    278    and CMP:   Recent Labs   Lab 05/18/24 1847 05/19/24  0644   * 120*   K 4.1 4.4   CO2 19* 18*   BUN 10.2 8.3*   CREATININE 1.22* 1.32*   CALCIUM 9.2 8.4   ALBUMIN 4.0 3.7   BILITOT 0.6 0.8   ALKPHOS 71 73   AST 37* 30   ALT 25 22       Diagnostic Results:  I have reviewed all pertinent imaging results/findings within the past 24 hours.    Assessment/Plan:     Active Hospital Problems    Diagnosis    *Renal mass, right     Hyponatremia    Hematuria       Plan:  Patient with large right renal mass, suspect renal cell carcinoma.   Await further work-up with CT chest.   Plan is likely for surgery.  Await further work-up and pathology from surgery for recommendations.   May need adjuvant treatment with immunotherapy after surgery.     Hyponatremia may be in part from beer potomania. Nephrology has been consulted.    Thank you for your consult. I will follow-up with patient. Please contact us if you have any additional questions.    Bibiana Schumacher MD  Hematology/Oncology  [facility]  Oncology [facility]

## 2024-05-19 NOTE — PROGRESS NOTES
[facility]  Hospital Medicine Progress Note        Chief Complaint: Inpatient Follow-up     HPI: This is a 58-year-old male with medical history of hypertension, 4-5 beers daily alcohol consumption present to the ED with complaint of suprapubic pain, burning urination and hematuria for 2 days.  Reports similar episode few months back and took Pyridium in his symptom resolved.  No other episode of hematuria until now.  Denies weight loss or night sweats.     On arrival to ED he was afebrile, hypertensive and saturating 97% on room air.  Labs notable for WBC 11.37, hemoglobin 13.6, platelets 301, INR 1.0, normal PTT, sodium 122, chloride 89, CO2 19, creatinine 1.22, BUN 10.2, normal total bilirubin, AST 37, albumin 4.0, urinalysis show hematuria with 50-99 RBC, 11-20 WBC and occasional bacteria no nitrites.     CT abdomen pelvis show right renal large heterogeneous mass measuring 10.9 x 9.6 x 11.8 cm consistent with renal cell carcinoma, the mass and rounded by inflammatory stranding, and encroaches through the cortex and reach the right renal pelvis which is slightly dilated, mild dilation of the right ureter and slight periureteral inflammation No tumor thrombus in the right renal vein or IVC, subcentimeter retroperitoneal and periaortic lymph nodes, urinary bladder wall is diffusely thickened     He was given 2 L of normal saline, ceftriaxone, analgesics and referred to hospital medicine service for further evaluation and managemen    Interval Hx:   Urology on board, opines that patient will ultimately require right nephrectomy, recommending MRA/MRV to make clear the vein is patent and also CT of the chest .Sodium level continues to drop, at  120 this AM while on fluids, Nephrology was consulted    Patient was seen and examined, reports that his discomfort is getting better, denies any fevers or chills or any other concerning complaints.    Chart was reviewed, afebrile, intermittent  elevation in blood pressure noted, most recent lab work reviewed, leukocytosis noted 12.5 .  Sodium drop noted  .    Objective/physical exam:  General: In no acute distress, afebrile  Chest: Clear to auscultation bilaterally  Heart: +S1, S2, no appreciable murmur  Abdomen: Soft, nontender, BS +  MSK: Warm, no lower extremity edema, no clubbing or cyanosis  Neurologic: Alert and oriented, Strength 5/5 in all 4 extremities    VITAL SIGNS: 24 HRS MIN & MAX LAST   Temp  Min: 97.5 °F (36.4 °C)  Max: 98.7 °F (37.1 °C) 98.7 °F (37.1 °C)   BP  Min: 102/65  Max: 186/95 (!) 169/104   Pulse  Min: 65  Max: 90  85   Resp  Min: 15  Max: 19 18   SpO2  Min: 93 %  Max: 98 % 96 %     I have reviewed the following labs:  Recent Labs   Lab 05/18/24 1847 05/19/24  0644   WBC 11.37 12.57*   RBC 4.42* 4.19*   HGB 13.6* 13.1*   HCT 38.1* 36.1*   MCV 86.2 86.2   MCH 30.8 31.3*   MCHC 35.7 36.3*   RDW 11.2* 11.2*    278   MPV 9.0 9.2     Recent Labs   Lab 05/18/24  1847 05/19/24  0644 05/19/24  1101 05/19/24  1255   * 120*  --  117*   K 4.1 4.4  --   --    CO2 19* 18*  --   --    BUN 10.2 8.3*  --   --    CREATININE 1.22* 1.32*  --   --    CALCIUM 9.2 8.4  --   --    PH  --   --  7.420  --    MG 2.00 2.60  --   --    ALBUMIN 4.0 3.7  --   --    ALKPHOS 71 73  --   --    ALT 25 22  --   --    AST 37* 30  --   --    BILITOT 0.6 0.8  --   --      Microbiology Results (last 7 days)       Procedure Component Value Units Date/Time    Urine culture [[id number]] Collected: 05/18/24 1840    Order Status: Completed Specimen: Urine Updated: 05/19/24 0633     Urine Culture No Growth At 24 Hours    Blood culture #1 **CANNOT BE ORDERED STAT** [[id number]] Collected: 05/18/24 2132    Order Status: Resulted Specimen: Blood Updated: 05/18/24 2200    Blood culture #2 **CANNOT BE ORDERED STAT** [[id number]] Collected: 05/18/24 2132    Order Status: Resulted Specimen: Blood Updated: 05/18/24 2148             See below for Radiology    Scheduled  Med:   amLODIPine  10 mg Oral Daily    cefTRIAXone (Rocephin) IV (PEDS and ADULTS)  1 g Intravenous Q24H    folic acid  1 mg Oral Daily    lisinopriL  40 mg Oral Daily    [START ON 5/20/2024] magnesium oxide  400 mg Oral BID    multivitamin  1 tablet Oral Daily    potassium, sodium phosphates  1 packet Oral QID    sodium chloride  1,000 mg Oral BID    thiamine  200 mg Oral Daily      Continuous Infusions:   sodium chloride 0.9%   Intravenous Continuous 100 mL/hr at 05/19/24 1002 New Bag at 05/19/24 1002      PRN Meds:    Current Facility-Administered Medications:     acetaminophen, 650 mg, Oral, Q4H PRN    aluminum-magnesium hydroxide-simethicone, 30 mL, Oral, QID PRN    chlordiazepoxide, 25 mg, Oral, QID PRN    dextrose 10%, 12.5 g, Intravenous, PRN    dextrose 10%, 25 g, Intravenous, PRN    glucagon (human recombinant), 1 mg, Intramuscular, PRN    glucose, 16 g, Oral, PRN    glucose, 24 g, Oral, PRN    hydrALAZINE, 10 mg, Intravenous, Q4H PRN    HYDROcodone-acetaminophen, 1 tablet, Oral, Q4H PRN    hyoscyamine, 0.125 mg, Sublingual, Q4H PRN    labetalol, 10 mg, Intravenous, Q4H PRN    lorazepam, 2 mg, Intravenous, Q15 Min PRN    melatonin, 6 mg, Oral, Nightly PRN    ondansetron, 4 mg, Intravenous, Q4H PRN    polyethylene glycol, 17 g, Oral, BID PRN    prochlorperazine, 5 mg, Intravenous, Q6H PRN    senna-docusate 8.6-50 mg, 2 tablet, Oral, BID PRN    sodium chloride 0.9%, 10 mL, Intravenous, PRN     Assessment/Plan:  Large right renal mass consistent with RCC  Acute UTI  Cystitis and possible right ureteritis & pyelonephritis  Hematuria secondary to renal mass and UTI  Hyponatremia  Suspect beer potomania  FRED versus CKD2  Hypertension, uncontrolled  Heavy alcohol use/beer    Plan   Urology on board,opines that patient will ultimately require right nephrectomy, recommending MRA/MRV to make clear the vein is patent and also CT of the chest.  Recommended Oncology consultation.    Monitor H&H  Oncology evaluated the  patient.  Continue ceftriaxone.  Follow up blood cultures, urine cultures.  Monitor fever curve and WBC.  FRED versus CKD? Monitor renal function, initiated on fluids.  Creatinine trend-1.2->1.3. Avoid nephrotoxins  Noted hyponatremia.  Nephrology consulted.  Worsening with fluids, May need fluid restriction.  Initiated on salt tablets.  Monitor sodium levels,  Avoid over-correction.  Continue supportive care and other appropriate medications.  Monitor blood pressure.  On p.r.n. antihypertensives.  Scheduled amlodipine.  Initiate p.o. scheduled hydralazine    Critical care Time Spent>35 min  Severe hyponatremia    VTE prophylaxis:  SCDs      Anticipated discharge and Disposition:   To be decided      All diagnosis and differential diagnosis have been reviewed; assessment and plan has been documented; I have personally reviewed the labs and test results that are presently available; I have reviewed the patients medication list; I have reviewed the consulting providers response and recommendations. I have reviewed or attempted to review medical records based upon their availability    All of the patient's questions have been  addressed and answered. Patient's is agreeable to the above stated plan. I will continue to monitor closely and make adjustments to medical management as needed.  _____________________________________________________________________    Nutrition Status:    Radiology:  I have personally reviewed the following imaging and agree with the radiologist.     CT Chest With Contrast  Narrative: EXAMINATION:  CT CHEST WITH CONTRAST    CLINICAL HISTORY:  Occult malignancy;    TECHNIQUE:  Low dose axial images, sagittal and coronal reformations were obtained from the thoracic inlet to the lung bases. Contrast was  administered.  Automatic exposure control is utilized to reduce patient radiation exposure.    COMPARISON:  None.    FINDINGS:  The lungs are adequately aerated.  No infiltrate is seen.  No mass is  seen.  There is a calcified granuloma in the left upper lobe.  There is calcified lymphadenopathy in the left hilum and upper mediastinum..  No pleural effusion is seen.  No pleural thickening is seen.  The mediastinum appears grossly unremarkable.  .  No enhancing lesion is seen.  Thoracic aorta appears grossly unremarkable.  Heart appears normal.    Upper abdomen shows a large mass in the right kidney which is discussed in detail on separate report.  Impression: No malignancy seen in the chest    Previous granulomatous disease exposure    Large right renal mass discussed on separate report    Electronically signed by: Papo Bardales  Date:    05/19/2024  Time:    12:58      Anahy Kidd MD  Department of Hospital Medicine   [facility]   05/19/2024

## 2024-05-19 NOTE — CONSULTS
[facility] Nephrology New Consult Note    Patient Name: Elan Lentz Jr.  Admission Date: 5/18/2024  Hospital Length of Stay: 0 days  Code Status: Full Code   Attending Physician: Anahy Kidd MD   Primary Care Physician: No, Primary Doctor  Principal Problem:Renal mass, right    HPI:    57 yo male, presented to [facility] with h/o several weeks of gross hematuria and dysuria. He was taking Azo at home w/o relief. He denies any pain but did endorse some right flank discomfort. CT A/P w/ contrast done 5/17/24 showed large right renal mass measuring 10.9X9.6X11.8cm encroaches through renal cortex and right renal pelvis, causes mild right hydronephrosis and hydroureter, likely c/w renal cell carcinoma. No tumor thrombus. Patient admitted to hospital. Labs remarkable for hyponatremia and patient did report drinking large quantities of beer. Patient seen by urologist Dr. Pena. CT chest ordered and surgery will likely be planned with robotic assisted laparoscopic right nephrectomy. Oncology consulted for evaluation as well.      Patient seen and examined. No new complaints reported  Admitted to drinking beer sometimes heavily daily according to him few months ago he was seen by a nurse practitioner and his sodium was low and he was told it is related to beer intake  Of note that patient has been getting sodium chloride since yesterday and CT of the chest is been ordered with contrast and CT of the abdomen and pelvis was done with contrast yesterday      Medical History:   Past Medical History:   Diagnosis Date    Hypertension    As mentioned previous history of hyponatremia    Surgical History:   Past Surgical History:   Procedure Laterality Date    APPENDECTOMY         Family History:   No family history on file..     Social History:   Social History     Tobacco Use    Smoking status: Never    Smokeless tobacco: Never   Substance Use Topics    Alcohol use: Yes     Comment: almost daily       Allergies:  Review of patient's  allergies indicates:  No Known Allergies      Review of Systems:  Constitutional: Denies fever, fatigue, chills, or generalized weakness  Skin: Denies wounds, rashes, no skin lesions or itching  EENT: Denies acute hearing/vision changes, tinnitus, rhinorrhea or dysphagia  Respiratory:  Denies cough, shortness of breath, or wheezing  Cardiovascular: Denies chest pain, palpitations, or swelling  Gastrointestional: Denies abdominal pain, nausea, vomiting, diarrhea or constipation  Genitourinary:  Occasional suprapubic discomfort currently  Musculoskeletal: Denies myalgias, back pain, flank pain, new decreased ROM or acute focal weakness  Neurological: Denies headaches, seizures, paresthesias, dizziness or asterixis  Hematological: Denies unusual bruising or bleeding  Psychiatric: Denies psychiatric concerns, hallucinations, or depression; no confusion    Medications:    Current Facility-Administered Medications:     0.9%  NaCl infusion, , Intravenous, Continuous, Magda Saucedo MD, Last Rate: 100 mL/hr at 05/19/24 1002, New Bag at 05/19/24 1002    acetaminophen tablet 650 mg, 650 mg, Oral, Q4H PRN, Magda Saucedo MD    aluminum-magnesium hydroxide-simethicone 200-200-20 mg/5 mL suspension 30 mL, 30 mL, Oral, QID PRN, Magda Saucedo MD    amLODIPine tablet 10 mg, 10 mg, Oral, Daily, Magda Saucedo MD, 10 mg at 05/19/24 0815    cefTRIAXone (Rocephin) 1 g in dextrose 5 % in water (D5W) 100 mL IVPB (MB+), 1 g, Intravenous, Q24H, Dany Garduno FNP, Stopped at 05/18/24 2221    chlordiazepoxide capsule 25 mg, 25 mg, Oral, QID PRN, Magda Saucedo MD    dextrose 10% bolus 125 mL 125 mL, 12.5 g, Intravenous, PRN, Magda Saucedo MD    dextrose 10% bolus 250 mL 250 mL, 25 g, Intravenous, PRN, Magda Saucedo MD    folic acid tablet 1 mg, 1 mg, Oral, Daily, Magda Saucedo MD, 1 mg at 05/19/24 0815    glucagon (human recombinant) injection 1 mg, 1 mg, Intramuscular, PRN, Magda Saucedo MD    glucose chewable tablet 16 g, 16 g, Oral, PRN,  Magda Saucedo MD    glucose chewable tablet 24 g, 24 g, Oral, PRN, Magda Saucedo MD    hydrALAZINE injection 10 mg, 10 mg, Intravenous, Q4H PRN, Magda Saucedo MD    HYDROcodone-acetaminophen 5-325 mg per tablet 1 tablet, 1 tablet, Oral, Q4H PRN, Magda Saucedo MD, 1 tablet at 05/19/24 1016    hyoscyamine ODT tablet 0.125 mg, 0.125 mg, Sublingual, Q4H PRN, Magda Saucedo MD, 0.125 mg at 05/19/24 0815    labetaloL injection 10 mg, 10 mg, Intravenous, Q4H PRN, Magda Saucedo MD    lisinopriL tablet 40 mg, 40 mg, Oral, Daily, Magda Saucedo MD, 40 mg at 05/19/24 0815    LORazepam (ATIVAN) injection 2 mg, 2 mg, Intravenous, Q15 Min PRN, Magda Saucedo MD    [START ON 5/20/2024] magnesium oxide tablet 400 mg, 400 mg, Oral, BID, Magda Saucedo MD    melatonin tablet 6 mg, 6 mg, Oral, Nightly PRN, Magda Saucedo MD, 6 mg at 05/19/24 0406    multivitamin tablet, 1 tablet, Oral, Daily, Magda Saucedo MD, 1 tablet at 05/19/24 0815    ondansetron injection 4 mg, 4 mg, Intravenous, Q4H PRN, Magda Saucedo MD    polyethylene glycol packet 17 g, 17 g, Oral, BID PRN, Magda Saucedo MD    potassium, sodium phosphates 280-160-250 mg packet 1 packet, 1 packet, Oral, QID, Magda Saucedo MD, 1 packet at 05/19/24 1016    prochlorperazine injection Soln 5 mg, 5 mg, Intravenous, Q6H PRN, Magda Saucedo MD    senna-docusate 8.6-50 mg per tablet 2 tablet, 2 tablet, Oral, BID PRN, Magda Saucedo MD    sodium chloride 0.9% flush 10 mL, 10 mL, Intravenous, PRN, Magda Saucedo MD    sodium chloride oral tablet 1,000 mg, 1,000 mg, Oral, BID, Layla Anthony MD    thiamine tablet 200 mg, 200 mg, Oral, Daily, Magda Saucedo MD, 200 mg at 05/19/24 0815    tolvaptan tablet 30 mg, 30 mg, Oral, Once, Layla Anthony MD     Scheduled Meds:   amLODIPine  10 mg Oral Daily    cefTRIAXone (Rocephin) IV (PEDS and ADULTS)  1 g Intravenous Q24H    folic acid  1 mg Oral Daily    lisinopriL  40 mg Oral Daily    [START ON 5/20/2024] magnesium oxide  400 mg Oral BID     "multivitamin  1 tablet Oral Daily    potassium, sodium phosphates  1 packet Oral QID    sodium chloride  1,000 mg Oral BID    thiamine  200 mg Oral Daily    tolvaptan  30 mg Oral Once     Continuous Infusions:   sodium chloride 0.9%   Intravenous Continuous 100 mL/hr at 05/19/24 1002 New Bag at 05/19/24 1002         Objective:  BP (!) 186/95   Pulse 73   Temp 97.8 °F (36.6 °C) (Oral)   Resp 18   Ht 5' 6" (1.676 m)   Wt 97.5 kg (215 lb)   SpO2 95%   BMI 34.70 kg/m²  Body mass index is 34.7 kg/m².    I/O last 3 completed shifts:  In: 3517.8 [P.O.:960; I.V.:1557.8; IV Piggyback:1000]  Out: 2880 [Urine:2880]  No intake/output data recorded.        Physical Exam:  General: no acute distress, awake, alert  Eyes: PERRLA, EOMI, conjunctiva clear,   HENT: atraumatic, oropharynx and nasal mucosa patent, no difficulty hearing  Neck: full ROM, no JVD, no thyromegaly or lymphadenopathy  Respiratory: equal, unlabored, fine rhonchi at the bases  Cardiovascular: RRR without murmur or rub; BL radial and pedal pulses felt  Edema:  Trace  Gastrointestinal: soft, non-tender, non-distended; positive bowel sounds; no masses to palpation    Musculoskeletal: full ROM without limitation or discomfort  Integumentary: warm, dry; no rashes, wounds, or skin lesions  Neurological: oriented, appropriate, no acute deficits    Labs:  Chemistries:   Recent Labs   Lab 05/18/24 1847 05/19/24  0644   * 120*   K 4.1 4.4   CO2 19* 18*   BUN 10.2 8.3*   CREATININE 1.22* 1.32*   CALCIUM 9.2 8.4   BILITOT 0.6 0.8   ALKPHOS 71 73   ALT 25 22   AST 37* 30   GLUCOSE 84 134*   MG 2.00 2.60   PHOS  --  1.9*        CBC/Anemia Labs: Coags:    Recent Labs   Lab 05/18/24 1847 05/19/24  0644   WBC 11.37 12.57*   HGB 13.6* 13.1*   HCT 38.1* 36.1*    278   MCV 86.2 86.2   RDW 11.2* 11.2*    Recent Labs   Lab 05/18/24  2331   INR 1.0          Impression:    Patient Active Problem List   Diagnosis    Renal mass, right    Hematuria    Hyponatremia "     Hyponatremia in the setting of lower urine osmolality concerning for beer potomania  Obviously patient at risk for higher ADH state in view of his possible malignancy however urine osmolality but so far not suggested      Plan:  I would have preferred to stop the sodium chloride IV however he needs it since he is going for CT of the chest with contrast   Meanwhile I recommend fluid restriction salt tabs and we will give 1 dose of tolvaptan  NEED TO MIX ALL IV MEDS IN NSS ( NOT D5W)  Repeat renal function evaluation will be done in the morning and we will be closely monitoring his electrolytes situation    Layla Anthony      Thank you for this consult.

## 2024-05-20 LAB
ALBUMIN SERPL-MCNC: 3.5 G/DL (ref 3.5–5)
ALBUMIN/GLOB SERPL: 1.1 RATIO (ref 1.1–2)
ALP SERPL-CCNC: 66 UNIT/L (ref 40–150)
ALT SERPL-CCNC: 21 UNIT/L (ref 0–55)
ANION GAP SERPL CALC-SCNC: 11 MEQ/L
AST SERPL-CCNC: 33 UNIT/L (ref 5–34)
BACTERIA UR CULT: NORMAL
BASOPHILS # BLD AUTO: 0.02 X10(3)/MCL
BASOPHILS NFR BLD AUTO: 0.1 %
BILIRUB SERPL-MCNC: 0.7 MG/DL
BUN SERPL-MCNC: 9 MG/DL (ref 8.4–25.7)
CALCIUM SERPL-MCNC: 8.5 MG/DL (ref 8.4–10.2)
CHLORIDE SERPL-SCNC: 93 MMOL/L (ref 98–107)
CO2 SERPL-SCNC: 21 MMOL/L (ref 22–29)
CREAT SERPL-MCNC: 1.4 MG/DL (ref 0.73–1.18)
CREAT/UREA NIT SERPL: 6
EOSINOPHIL # BLD AUTO: 0.01 X10(3)/MCL (ref 0–0.9)
EOSINOPHIL NFR BLD AUTO: 0.1 %
ERYTHROCYTE [DISTWIDTH] IN BLOOD BY AUTOMATED COUNT: 11.5 % (ref 11.5–17)
GFR SERPLBLD CREATININE-BSD FMLA CKD-EPI: 58 ML/MIN/1.73/M2
GLOBULIN SER-MCNC: 3.3 GM/DL (ref 2.4–3.5)
GLUCOSE SERPL-MCNC: 108 MG/DL (ref 74–100)
HCT VFR BLD AUTO: 36.4 % (ref 42–52)
HGB BLD-MCNC: 12.6 G/DL (ref 14–18)
IMM GRANULOCYTES # BLD AUTO: 0.06 X10(3)/MCL (ref 0–0.04)
IMM GRANULOCYTES NFR BLD AUTO: 0.4 %
LYMPHOCYTES # BLD AUTO: 0.54 X10(3)/MCL (ref 0.6–4.6)
LYMPHOCYTES NFR BLD AUTO: 3.2 %
MAGNESIUM SERPL-MCNC: 2.5 MG/DL (ref 1.6–2.6)
MCH RBC QN AUTO: 30.6 PG (ref 27–31)
MCHC RBC AUTO-ENTMCNC: 34.6 G/DL (ref 33–36)
MCV RBC AUTO: 88.3 FL (ref 80–94)
MONOCYTES # BLD AUTO: 1.57 X10(3)/MCL (ref 0.1–1.3)
MONOCYTES NFR BLD AUTO: 9.3 %
NEUTROPHILS # BLD AUTO: 14.61 X10(3)/MCL (ref 2.1–9.2)
NEUTROPHILS NFR BLD AUTO: 86.9 %
NRBC BLD AUTO-RTO: 0 %
PHOSPHATE SERPL-MCNC: 4.7 MG/DL (ref 2.3–4.7)
PLATELET # BLD AUTO: 296 X10(3)/MCL (ref 130–400)
PMV BLD AUTO: 9.4 FL (ref 7.4–10.4)
POTASSIUM SERPL-SCNC: 4.6 MMOL/L (ref 3.5–5.1)
PROT SERPL-MCNC: 6.8 GM/DL (ref 6.4–8.3)
RBC # BLD AUTO: 4.12 X10(6)/MCL (ref 4.7–6.1)
SODIUM SERPL-SCNC: 121 MMOL/L (ref 136–145)
SODIUM SERPL-SCNC: 125 MMOL/L (ref 136–145)
SODIUM SERPL-SCNC: 128 MMOL/L (ref 136–145)
SODIUM SERPL-SCNC: 131 MMOL/L (ref 136–145)
WBC # SPEC AUTO: 16.81 X10(3)/MCL (ref 4.5–11.5)

## 2024-05-20 PROCEDURE — 63600175 PHARM REV CODE 636 W HCPCS: Performed by: INTERNAL MEDICINE

## 2024-05-20 PROCEDURE — 36415 COLL VENOUS BLD VENIPUNCTURE: CPT | Performed by: INTERNAL MEDICINE

## 2024-05-20 PROCEDURE — A9577 INJ MULTIHANCE: HCPCS | Performed by: INTERNAL MEDICINE

## 2024-05-20 PROCEDURE — 25000003 PHARM REV CODE 250: Performed by: INTERNAL MEDICINE

## 2024-05-20 PROCEDURE — 80053 COMPREHEN METABOLIC PANEL: CPT | Performed by: INTERNAL MEDICINE

## 2024-05-20 PROCEDURE — 84100 ASSAY OF PHOSPHORUS: CPT | Performed by: INTERNAL MEDICINE

## 2024-05-20 PROCEDURE — 99232 SBSQ HOSP IP/OBS MODERATE 35: CPT | Mod: ,,, | Performed by: INTERNAL MEDICINE

## 2024-05-20 PROCEDURE — 84295 ASSAY OF SERUM SODIUM: CPT | Performed by: INTERNAL MEDICINE

## 2024-05-20 PROCEDURE — 11000001 HC ACUTE MED/SURG PRIVATE ROOM

## 2024-05-20 PROCEDURE — 85025 COMPLETE CBC W/AUTO DIFF WBC: CPT | Performed by: INTERNAL MEDICINE

## 2024-05-20 PROCEDURE — 25500020 PHARM REV CODE 255: Performed by: INTERNAL MEDICINE

## 2024-05-20 PROCEDURE — 83735 ASSAY OF MAGNESIUM: CPT | Performed by: INTERNAL MEDICINE

## 2024-05-20 RX ORDER — TOLVAPTAN 15 MG/1
30 TABLET ORAL ONCE
Status: COMPLETED | OUTPATIENT
Start: 2024-05-20 | End: 2024-05-20

## 2024-05-20 RX ADMIN — Medication 400 MG: at 11:05

## 2024-05-20 RX ADMIN — FOLIC ACID 1 MG: 1 TABLET ORAL at 08:05

## 2024-05-20 RX ADMIN — THIAMINE HCL TAB 100 MG 200 MG: 100 TAB at 08:05

## 2024-05-20 RX ADMIN — Medication 400 MG: at 08:05

## 2024-05-20 RX ADMIN — HYDROCODONE BITARTRATE AND ACETAMINOPHEN 1 TABLET: 5; 325 TABLET ORAL at 03:05

## 2024-05-20 RX ADMIN — POTASSIUM & SODIUM PHOSPHATES POWDER PACK 280-160-250 MG 1 PACKET: 280-160-250 PACK at 01:05

## 2024-05-20 RX ADMIN — AMLODIPINE BESYLATE 10 MG: 5 TABLET ORAL at 08:05

## 2024-05-20 RX ADMIN — HYDROCODONE BITARTRATE AND ACETAMINOPHEN 1 TABLET: 5; 325 TABLET ORAL at 11:05

## 2024-05-20 RX ADMIN — CEFTRIAXONE SODIUM 1 G: 1 INJECTION, POWDER, FOR SOLUTION INTRAMUSCULAR; INTRAVENOUS at 11:05

## 2024-05-20 RX ADMIN — HYDRALAZINE HYDROCHLORIDE 25 MG: 25 TABLET, FILM COATED ORAL at 11:05

## 2024-05-20 RX ADMIN — POTASSIUM & SODIUM PHOSPHATES POWDER PACK 280-160-250 MG 1 PACKET: 280-160-250 PACK at 08:05

## 2024-05-20 RX ADMIN — GADOBENATE DIMEGLUMINE 20 ML: 529 INJECTION, SOLUTION INTRAVENOUS at 10:05

## 2024-05-20 RX ADMIN — HYDRALAZINE HYDROCHLORIDE 25 MG: 25 TABLET, FILM COATED ORAL at 01:05

## 2024-05-20 RX ADMIN — POTASSIUM & SODIUM PHOSPHATES POWDER PACK 280-160-250 MG 1 PACKET: 280-160-250 PACK at 11:05

## 2024-05-20 RX ADMIN — SODIUM CHLORIDE 1000 MG: 1 TABLET ORAL at 11:05

## 2024-05-20 RX ADMIN — HYDRALAZINE HYDROCHLORIDE 25 MG: 25 TABLET, FILM COATED ORAL at 05:05

## 2024-05-20 RX ADMIN — SODIUM CHLORIDE 1000 MG: 1 TABLET ORAL at 08:05

## 2024-05-20 RX ADMIN — TOLVAPTAN 30 MG: 15 TABLET ORAL at 11:05

## 2024-05-20 RX ADMIN — HYOSCYAMINE SULFATE 0.12 MG: 0.12 TABLET SUBLINGUAL at 08:05

## 2024-05-20 RX ADMIN — THERA TABS 1 TABLET: TAB at 08:05

## 2024-05-20 NOTE — PROGRESS NOTES
[facility]  Hospital Medicine Progress Note        Chief Complaint: Inpatient Follow-up     HPI: This is a 58-year-old male with medical history of hypertension, 4-5 beers daily alcohol consumption present to the ED with complaint of suprapubic pain, burning urination and hematuria for 2 days.  Reports similar episode few months back and took Pyridium in his symptom resolved.  No other episode of hematuria until now.  Denies weight loss or night sweats.     On arrival to ED he was afebrile, hypertensive and saturating 97% on room air.  Labs notable for WBC 11.37, hemoglobin 13.6, platelets 301, INR 1.0, normal PTT, sodium 122, chloride 89, CO2 19, creatinine 1.22, BUN 10.2, normal total bilirubin, AST 37, albumin 4.0, urinalysis show hematuria with 50-99 RBC, 11-20 WBC and occasional bacteria no nitrites.     CT abdomen pelvis show right renal large heterogeneous mass measuring 10.9 x 9.6 x 11.8 cm consistent with renal cell carcinoma, the mass and rounded by inflammatory stranding, and encroaches through the cortex and reach the right renal pelvis which is slightly dilated, mild dilation of the right ureter and slight periureteral inflammation No tumor thrombus in the right renal vein or IVC, subcentimeter retroperitoneal and periaortic lymph nodes, urinary bladder wall is diffusely thickened     He was given 2 L of normal saline, ceftriaxone, analgesics and referred to hospital medicine service for further evaluation and managemen    Urology on board, opines that patient will ultimately require right nephrectomy, recommending MRA/MRV to make clear the vein is patent and also CT of the chest .  CT of the chest with no malignancy seen , previous granulomatous disease noted.  Oncology was consulted per Urology's recommendation.  Nephrology was consulted for hyponatremia    Interval Hx:   Urology on board, MRI pending, sodium improving, nephrology continues to follow    Patient was seen  and examined, Chart was reviewed, afebrile, intermittent elevation in blood pressure noted, most recent lab work reviewed, leukocytosis noted 12.5>16 .  Sodium 125>128    Objective/physical exam:  General: In no acute distress, afebrile  Chest: Clear to auscultation bilaterally  Heart: +S1, S2  Abdomen: Soft, nontender, BS +  MSK: Warm, no lower extremity edema, no clubbing or cyanosis  Neurologic: Alert and oriented, Strength 5/5 in all 4 extremities    VITAL SIGNS: 24 HRS MIN & MAX LAST   Temp  Min: 97.5 °F (36.4 °C)  Max: 99.4 °F (37.4 °C) 98.3 °F (36.8 °C)   BP  Min: 141/91  Max: 176/90 (!) 150/87   Pulse  Min: 75  Max: 86  82   Resp  Min: 18  Max: 20 20   SpO2  Min: 94 %  Max: 98 % 97 %     I have reviewed the following labs:  Recent Labs   Lab 05/18/24 1847 05/19/24  0644 05/20/24 0319   WBC 11.37 12.57* 16.81*   RBC 4.42* 4.19* 4.12*   HGB 13.6* 13.1* 12.6*   HCT 38.1* 36.1* 36.4*   MCV 86.2 86.2 88.3   MCH 30.8 31.3* 30.6   MCHC 35.7 36.3* 34.6   RDW 11.2* 11.2* 11.5    278 296   MPV 9.0 9.2 9.4     Recent Labs   Lab 05/18/24  1847 05/19/24  0644 05/19/24  1101 05/19/24  1255 05/19/24  2339 05/20/24  0319 05/20/24  1224   * 120*  --    < > 121* 125* 128*   K 4.1 4.4  --   --   --  4.6  --    CO2 19* 18*  --   --   --  21*  --    BUN 10.2 8.3*  --   --   --  9.0  --    CREATININE 1.22* 1.32*  --   --   --  1.40*  --    CALCIUM 9.2 8.4  --   --   --  8.5  --    PH  --   --  7.420  --   --   --   --    MG 2.00 2.60  --   --   --  2.50  --    ALBUMIN 4.0 3.7  --   --   --  3.5  --    ALKPHOS 71 73  --   --   --  66  --    ALT 25 22  --   --   --  21  --    AST 37* 30  --   --   --  33  --    BILITOT 0.6 0.8  --   --   --  0.7  --     < > = values in this interval not displayed.     Microbiology Results (last 7 days)       Procedure Component Value Units Date/Time    Urine culture [[id number]] Collected: 05/18/24 1840    Order Status: Completed Specimen: Urine Updated: 05/20/24 2210     Urine  Culture No Significant Growth    Blood culture #1 **CANNOT BE ORDERED STAT** [[id number]]  (Normal) Collected: 05/18/24 2132    Order Status: Completed Specimen: Blood Updated: 05/19/24 2300     Blood Culture No Growth At 24 Hours    Blood culture #2 **CANNOT BE ORDERED STAT** [[id number]]  (Normal) Collected: 05/18/24 2132    Order Status: Completed Specimen: Blood Updated: 05/19/24 2201     Blood Culture No Growth At 24 Hours             See below for Radiology    Scheduled Med:   amLODIPine  10 mg Oral Daily    cefTRIAXone (Rocephin) IV (PEDS and ADULTS)  1 g Intravenous Q24H    folic acid  1 mg Oral Daily    hydrALAZINE  25 mg Oral Q8H    magnesium oxide  400 mg Oral BID    multivitamin  1 tablet Oral Daily    potassium, sodium phosphates  1 packet Oral QID    sodium chloride  1,000 mg Oral BID    thiamine  200 mg Oral Daily      Continuous Infusions:   sodium chloride 0.9%   Intravenous Continuous 50 mL/hr at 05/20/24 1021 Rate Change at 05/20/24 1021      PRN Meds:    Current Facility-Administered Medications:     acetaminophen, 650 mg, Oral, Q4H PRN    aluminum-magnesium hydroxide-simethicone, 30 mL, Oral, QID PRN    chlordiazepoxide, 25 mg, Oral, QID PRN    dextrose 10%, 12.5 g, Intravenous, PRN    dextrose 10%, 25 g, Intravenous, PRN    glucagon (human recombinant), 1 mg, Intramuscular, PRN    glucose, 16 g, Oral, PRN    glucose, 24 g, Oral, PRN    hydrALAZINE, 10 mg, Intravenous, Q4H PRN    HYDROcodone-acetaminophen, 1 tablet, Oral, Q4H PRN    hyoscyamine, 0.125 mg, Sublingual, Q4H PRN    labetalol, 10 mg, Intravenous, Q4H PRN    lorazepam, 2 mg, Intravenous, Q15 Min PRN    melatonin, 6 mg, Oral, Nightly PRN    ondansetron, 4 mg, Intravenous, Q4H PRN    polyethylene glycol, 17 g, Oral, BID PRN    prochlorperazine, 5 mg, Intravenous, Q6H PRN    senna-docusate 8.6-50 mg, 2 tablet, Oral, BID PRN    sodium chloride 0.9%, 10 mL, Intravenous, PRN     Assessment/Plan:  Large right renal mass consistent with  RCC  Acute UTI  Cystitis and possible right ureteritis & pyelonephritis  Hematuria secondary to renal mass and UTI  Hyponatremia  Suspect beer potomania  FRED versus CKD2  Hypertension, uncontrolled  Heavy alcohol use/beer    Plan   Urology on board,opines that patient will ultimately require right nephrectomy, plan to discuss outpatient, recommending MRA/MRV to make clear the vein is patent .  Recommended Oncology consultation.  CT chest that was ordered was with no malignancy showing previous granulomatous disease.  Hemoglobin 12.6  Oncology evaluated the patient.  Recommending to await pathology from surgery for further recommendations, Opines may need adjuvant treatment with immunotherapy after surgery based on pathology results  Continue ceftriaxone.  Follow up blood cultures, urine cultures-so far negative , follow up, continues to have white count.  Monitor fever curve and WBC.  FRED versus CKD? Monitoring renal function, nephrology on board. Avoid nephrotoxins  Noted hyponatremia.  Nephrology following.  Initiated on salt tablets.  Tolvaptan ordered, recommended fluid restriction. Monitor sodium levels,  Avoid over-correction.  Continue supportive care and other appropriate medications.  Monitor blood pressure.  On p.r.n. antihypertensives.  Scheduled amlodipine.  scheduled hydralazine, up titrate if needed    Critical care Time Spent>35 min  Hyponatremia requiring tolvaptan    VTE prophylaxis:  SCDs      Anticipated discharge and Disposition:   To be decided      All diagnosis and differential diagnosis have been reviewed; assessment and plan has been documented; I have personally reviewed the labs and test results that are presently available; I have reviewed the patients medication list; I have reviewed the consulting providers response and recommendations. I have reviewed or attempted to review medical records based upon their availability    All of the patient's questions have been  addressed and  answered. Patient's is agreeable to the above stated plan. I will continue to monitor closely and make adjustments to medical management as needed.  _____________________________________________________________________    Nutrition Status:    Radiology:  I have personally reviewed the following imaging and agree with the radiologist.     CT Chest With Contrast  Narrative: EXAMINATION:  CT CHEST WITH CONTRAST    CLINICAL HISTORY:  Occult malignancy;    TECHNIQUE:  Low dose axial images, sagittal and coronal reformations were obtained from the thoracic inlet to the lung bases. Contrast was  administered.  Automatic exposure control is utilized to reduce patient radiation exposure.    COMPARISON:  None.    FINDINGS:  The lungs are adequately aerated.  No infiltrate is seen.  No mass is seen.  There is a calcified granuloma in the left upper lobe.  There is calcified lymphadenopathy in the left hilum and upper mediastinum..  No pleural effusion is seen.  No pleural thickening is seen.  The mediastinum appears grossly unremarkable.  .  No enhancing lesion is seen.  Thoracic aorta appears grossly unremarkable.  Heart appears normal.    Upper abdomen shows a large mass in the right kidney which is discussed in detail on separate report.  Impression: No malignancy seen in the chest    Previous granulomatous disease exposure    Large right renal mass discussed on separate report    Electronically signed by: Papo Bardales  Date:    05/19/2024  Time:    12:58      Anahy Kidd MD  Department of Hospital Medicine   [facility]   05/20/2024

## 2024-05-20 NOTE — PLAN OF CARE
Problem: Adult Inpatient Plan of Care  Goal: Plan of Care Review  Outcome: Progressing  Flowsheets (Taken 5/19/2024 1918)  Plan of Care Reviewed With: patient  Goal: Patient-Specific Goal (Individualized)  Outcome: Progressing  Goal: Absence of Hospital-Acquired Illness or Injury  Outcome: Progressing  Intervention: Identify and Manage Fall Risk  Flowsheets (Taken 5/19/2024 1918)  Safety Promotion/Fall Prevention:   assistive device/personal item within reach   side rails raised x 2   nonskid shoes/socks when out of bed   commode/urinal/bedpan at bedside  Intervention: Prevent Skin Injury  Flowsheets (Taken 5/19/2024 1918)  Body Position:   position changed independently   sitting up in bed  Skin Protection: protective footwear used  Device Skin Pressure Protection: tubing/devices free from skin contact  Intervention: Prevent and Manage VTE (Venous Thromboembolism) Risk  Flowsheets (Taken 5/19/2024 1918)  VTE Prevention/Management:   ROM (active) performed   ambulation promoted  Intervention: Prevent Infection  Flowsheets (Taken 5/19/2024 1918)  Infection Prevention:   rest/sleep promoted   single patient room provided  Goal: Optimal Comfort and Wellbeing  Outcome: Progressing  Intervention: Monitor Pain and Promote Comfort  Flowsheets (Taken 5/19/2024 1918)  Pain Management Interventions: quiet environment facilitated  Intervention: Provide Person-Centered Care  Flowsheets (Taken 5/19/2024 1918)  Trust Relationship/Rapport:   questions answered   questions encouraged  Goal: Readiness for Transition of Care  Outcome: Progressing

## 2024-05-20 NOTE — PROGRESS NOTES
[facility] [place] [facility] [place]      HPI:     Patient Name: Elan Lentz Jr.  Admission Date: 5/18/2024  Hospital Length of Stay: 0 days  Code Status: Full Code   Attending Physician: Anahy Kidd MD   Primary Care Physician: No, Primary Doctor  Principal Problem:Renal mass, right      Today patient seen and examined  Labs, recent events, imaging and medications reviewed for this hospital stay  Patient for MRI today  He feels a little bit better denies any major complaints    Review of Systems:   Constitutional: Denies fever, fatigue, generalized weakness, night sweats, or acute weight change  Skin: Denies wounds, no rashes, no itching, no new skin lesions  HEENT: Denies acute change in hearing or vision, tinnitus, or dysphagia  Respiratory:  Denies cough, shortness of breath, or wheezing  Cardiovascular: Denies chest pain, palpitations, or swelling  Gastrointestional: Denies abdominal pain, nausea, vomiting, diarrhea, or constipation  Genitourinary: Denies dysuria, hematuria, or incontinence;   Musculoskeletal: Denies myalgias, back pain, flank pain, new decreased ROM or acute focal weakness  Neurological: Denies headaches, seizures, dizziness, paresthesias or asterixis  Hematological: Denies unusual bruising or bleeding  Psychiatric: Denies hallucinations, depression, or confusion      Medications:   Scheduled Meds:   amLODIPine  10 mg Oral Daily    cefTRIAXone (Rocephin) IV (PEDS and ADULTS)  1 g Intravenous Q24H    folic acid  1 mg Oral Daily    hydrALAZINE  25 mg Oral Q8H    magnesium oxide  400 mg Oral BID    multivitamin  1 tablet Oral Daily    potassium, sodium phosphates  1 packet Oral QID    sodium chloride  1,000 mg Oral BID    thiamine  200 mg Oral Daily    tolvaptan  30 mg Oral Once     Continuous Infusions:   sodium chloride 0.9%   Intravenous Continuous   Stopped at 05/19/24 1432         Vitals:     Vitals:    05/20/24 0000 05/20/24 0354 05/20/24 0400 05/20/24 0700   BP: (!) 154/92  (!)  152/88 (!) 141/91   Pulse: 79  75 86   Resp: 18 18 18 20   Temp: 98 °F (36.7 °C)  97.5 °F (36.4 °C) 97.7 °F (36.5 °C)   TempSrc:    Oral   SpO2: 97%  97% 97%   Weight:       Height:             I/O last 3 completed shifts:  In: 4930.2 [P.O.:1440; I.V.:2394.7; IV Piggyback:1095.5]  Out: 7780 [Urine:7780]    Intake/Output Summary (Last 24 hours) at 5/20/2024 1025  Last data filed at 5/20/2024 0608  Gross per 24 hour   Intake 1412.47 ml   Output 4400 ml   Net -2987.53 ml       Physical Exam:   General: no acute distress, awake, alert  Eyes: PERRLA, EOMI, conjunctiva clear,   HENT: atraumatic, oropharynx and nasal mucosa patent  Neck: full ROM, no JVD, no thyromegaly or lymphadenopathy  Respiratory: equal, unlabored, clear to auscultation A/P  Cardiovascular: RRR without murmur or rub; BL radial and pedal pulses felt  Edema: none  Gastrointestinal: soft, non-tender, non-distended; positive bowel sounds; no masses to palpation    Musculoskeletal: full ROM without limitation or discomfort  Integumentary: warm, dry; no rashes, wounds, or skin lesions  Neurological: oriented, appropriate, no acute deficits        Labs:     Chemistries:   Recent Labs   Lab 05/18/24  1847 05/19/24  0644 05/19/24  1255 05/19/24  1816 05/19/24  2339 05/20/24  0319   * 120*   < > 121* 121* 125*   K 4.1 4.4  --   --   --  4.6   CO2 19* 18*  --   --   --  21*   BUN 10.2 8.3*  --   --   --  9.0   CREATININE 1.22* 1.32*  --   --   --  1.40*   CALCIUM 9.2 8.4  --   --   --  8.5   BILITOT 0.6 0.8  --   --   --  0.7   ALKPHOS 71 73  --   --   --  66   ALT 25 22  --   --   --  21   AST 37* 30  --   --   --  33   GLUCOSE 84 134*  --   --   --  108*   MG 2.00 2.60  --   --   --  2.50   PHOS  --  1.9*  --   --   --  4.7    < > = values in this interval not displayed.        CBC/Anemia Labs: Coags:    Recent Labs   Lab 05/18/24  1847 05/19/24  0644 05/20/24  0319   WBC 11.37 12.57* 16.81*   HGB 13.6* 13.1* 12.6*   HCT 38.1* 36.1* 36.4*    278  296   MCV 86.2 86.2 88.3   RDW 11.2* 11.2* 11.5    Recent Labs   Lab 05/18/24  2331   INR 1.0          Impression:     Patient Active Problem List   Diagnosis    Renal mass, right    Hematuria    Hyponatremia     Hyponatremia likely related to beer potomania sodium level slowly correcting  Original urine osmolality not quite in favor of inappropriate ADH secretion    Mild contrast nephropathy with worsening renal function  Plan:   Decrease IV fluids to 50 cc an hour     Continue water restriction  Another dose of tolvaptan today 30 mg x 1    Labs in a.m.    Layla Anthony

## 2024-05-20 NOTE — PROGRESS NOTES
PROGRESS NOTE     CC:  Right renal mass, gross hematuria    HPI     Elan Lentz Jr. is a 58 y.o. male presented with gross hematuria and dysuria.  CT revealed large right renal mass a proximally 11 cm encroaching the renal pelvis and renal vein.  Mild dilation of the right ureter.    Patient seen and examined this morning.  He was doing well.  Pending MRI for further evaluation of potential tumor thrombus.  Creatinine stable at 1.4.       Past Medical History:   Diagnosis Date    Hypertension      Past Surgical History:   Procedure Laterality Date    APPENDECTOMY       No family history on file.  Social History     Tobacco Use    Smoking status: Never    Smokeless tobacco: Never   Substance Use Topics    Alcohol use: Yes     Comment: almost daily       Review of patient's allergies indicates:  No Known Allergies      Current Facility-Administered Medications:     0.9%  NaCl infusion, , Intravenous, Continuous, Layla Anthony MD, Last Rate: 50 mL/hr at 05/20/24 1021, Rate Change at 05/20/24 1021    acetaminophen tablet 650 mg, 650 mg, Oral, Q4H PRN, Magda Saucedo MD, 650 mg at 05/19/24 2058    aluminum-magnesium hydroxide-simethicone 200-200-20 mg/5 mL suspension 30 mL, 30 mL, Oral, QID PRN, Magda Saucedo MD    amLODIPine tablet 10 mg, 10 mg, Oral, Daily, Magda Saucedo MD, 10 mg at 05/20/24 0806    cefTRIAXone (Rocephin) 1 g in sodium chloride 0.9 % 100 mL IVPB (MB+), 1 g, Intravenous, Q24H, Layla Anthony MD, Stopped at 05/19/24 2340    chlordiazepoxide capsule 25 mg, 25 mg, Oral, QID PRN, Magda Saucedo MD    dextrose 10% bolus 125 mL 125 mL, 12.5 g, Intravenous, PRN, Magda Saucedo MD    dextrose 10% bolus 250 mL 250 mL, 25 g, Intravenous, PRNLewis Ali M, MD    folic acid tablet 1 mg, 1 mg, Oral, Daily, Magda Saucedo MD, 1 mg at 05/20/24 0807    glucagon (human recombinant) injection 1 mg, 1 mg, Intramuscular, PRN, Magda Saucedo MD    glucose chewable tablet 16 g, 16 g, Oral, PRN, Magda Saucedo, MD     glucose chewable tablet 24 g, 24 g, Oral, PRN, Magda Saucedo MD    hydrALAZINE injection 10 mg, 10 mg, Intravenous, Q4H PRN, Magda Saucedo MD    hydrALAZINE tablet 25 mg, 25 mg, Oral, Q8H, Anahy Kidd MD, 25 mg at 05/20/24 0527    HYDROcodone-acetaminophen 5-325 mg per tablet 1 tablet, 1 tablet, Oral, Q4H PRN, Magda Saucedo MD, 1 tablet at 05/20/24 1120    hyoscyamine ODT tablet 0.125 mg, 0.125 mg, Sublingual, Q4H PRN, Magda Saucedo MD, 0.125 mg at 05/20/24 0807    labetaloL injection 10 mg, 10 mg, Intravenous, Q4H PRN, Magda Saucedo MD    LORazepam (ATIVAN) injection 2 mg, 2 mg, Intravenous, Q15 Min PRN, Magda Saucedo MD    magnesium oxide tablet 400 mg, 400 mg, Oral, BID, Magda Saucedo MD, 400 mg at 05/20/24 0806    melatonin tablet 6 mg, 6 mg, Oral, Nightly PRN, Magda Saucedo MD, 6 mg at 05/19/24 0406    multivitamin tablet, 1 tablet, Oral, Daily, Magda Saucedo MD, 1 tablet at 05/20/24 0806    ondansetron injection 4 mg, 4 mg, Intravenous, Q4H PRN, Magda Saucedo MD    polyethylene glycol packet 17 g, 17 g, Oral, BID PRN, Magda Saucedo MD    potassium, sodium phosphates 280-160-250 mg packet 1 packet, 1 packet, Oral, QID, Magda Saucedo MD, 1 packet at 05/20/24 0806    prochlorperazine injection Soln 5 mg, 5 mg, Intravenous, Q6H PRN, Magda Saucedo MD    senna-docusate 8.6-50 mg per tablet 2 tablet, 2 tablet, Oral, BID PRN, Magda Saucedo MD    sodium chloride 0.9% flush 10 mL, 10 mL, Intravenous, PRN, Magda Saucedo MD    sodium chloride oral tablet 1,000 mg, 1,000 mg, Oral, BID, Layla Anthony MD, 1,000 mg at 05/20/24 0810    thiamine tablet 200 mg, 200 mg, Oral, Daily, Magda Saucedo MD, 200 mg at 05/20/24 0807    Physical exam     Vitals:    05/20/24 0400 05/20/24 0700 05/20/24 1120 05/20/24 1130   BP: (!) 152/88 (!) 141/91  (!) 150/87   Pulse: 75 86  82   Resp: 18 20 20    Temp: 97.5 °F (36.4 °C) 97.7 °F (36.5 °C)  98.3 °F (36.8 °C)   TempSrc:  Oral  Oral   SpO2: 97% 97%  97%   Weight:       Height:            NAD  AAOx3  Skin warm and dry  Affect appropriate  Grossly intact neurologically  NCAT  RRR  Respirations even/unlabored  Abdomen soft, nontender, nondistended  Flank nontender  Extremities no clubbing, cyanosis, edema    Lab     Lab Results   Component Value Date    WBC 16.81 (H) 05/20/2024    HGB 12.6 (L) 05/20/2024    HCT 36.4 (L) 05/20/2024     05/20/2024    ALT 21 05/20/2024    AST 33 05/20/2024     (L) 05/20/2024    K 4.6 05/20/2024    BUN 9.0 05/20/2024    CO2 21 (L) 05/20/2024    INR 1.0 05/18/2024       Lab Results   Component Value Date    LEUKOCYTESUR 75 (A) 05/18/2024    RBCUA 50-99 (A) 05/18/2024    WBCUA 11-20 (A) 05/18/2024         Imaging     11 cm, necrotic appearing right renal mass encroaching the renal pelvis.  Potential renal vein thrombus    Assessment     58-year-old male with large right renal mass concerning for primary renal malignancy    Plan     MRI pending for further evaluation of potential tumor thrombus.  Chest CT without evidence of metastatic disease.   He understands that mass is likely malignant.  He plans to follow up outpatient to discuss radical right nephrectomy.    AF

## 2024-05-21 VITALS
WEIGHT: 215 LBS | TEMPERATURE: 98 F | HEIGHT: 66 IN | RESPIRATION RATE: 18 BRPM | DIASTOLIC BLOOD PRESSURE: 91 MMHG | BODY MASS INDEX: 34.55 KG/M2 | HEART RATE: 79 BPM | SYSTOLIC BLOOD PRESSURE: 158 MMHG | OXYGEN SATURATION: 97 %

## 2024-05-21 LAB
ALBUMIN SERPL-MCNC: 3.4 G/DL (ref 3.5–5)
ALBUMIN/GLOB SERPL: 1 RATIO (ref 1.1–2)
ALP SERPL-CCNC: 62 UNIT/L (ref 40–150)
ALT SERPL-CCNC: 19 UNIT/L (ref 0–55)
ANION GAP SERPL CALC-SCNC: 10 MEQ/L
AST SERPL-CCNC: 32 UNIT/L (ref 5–34)
BASOPHILS # BLD AUTO: 0.03 X10(3)/MCL
BASOPHILS NFR BLD AUTO: 0.2 %
BILIRUB SERPL-MCNC: 0.5 MG/DL
BUN SERPL-MCNC: 11.1 MG/DL (ref 8.4–25.7)
CALCIUM SERPL-MCNC: 8.8 MG/DL (ref 8.4–10.2)
CHLORIDE SERPL-SCNC: 99 MMOL/L (ref 98–107)
CO2 SERPL-SCNC: 21 MMOL/L (ref 22–29)
CREAT SERPL-MCNC: 1.31 MG/DL (ref 0.73–1.18)
CREAT/UREA NIT SERPL: 8
EOSINOPHIL # BLD AUTO: 0.01 X10(3)/MCL (ref 0–0.9)
EOSINOPHIL NFR BLD AUTO: 0.1 %
ERYTHROCYTE [DISTWIDTH] IN BLOOD BY AUTOMATED COUNT: 11.8 % (ref 11.5–17)
GFR SERPLBLD CREATININE-BSD FMLA CKD-EPI: >60 ML/MIN/1.73/M2
GLOBULIN SER-MCNC: 3.4 GM/DL (ref 2.4–3.5)
GLUCOSE SERPL-MCNC: 89 MG/DL (ref 74–100)
HCT VFR BLD AUTO: 37.5 % (ref 42–52)
HGB BLD-MCNC: 13.1 G/DL (ref 14–18)
IMM GRANULOCYTES # BLD AUTO: 0.05 X10(3)/MCL (ref 0–0.04)
IMM GRANULOCYTES NFR BLD AUTO: 0.3 %
LYMPHOCYTES # BLD AUTO: 0.9 X10(3)/MCL (ref 0.6–4.6)
LYMPHOCYTES NFR BLD AUTO: 5.7 %
MCH RBC QN AUTO: 31.2 PG (ref 27–31)
MCHC RBC AUTO-ENTMCNC: 34.9 G/DL (ref 33–36)
MCV RBC AUTO: 89.3 FL (ref 80–94)
MONOCYTES # BLD AUTO: 1.75 X10(3)/MCL (ref 0.1–1.3)
MONOCYTES NFR BLD AUTO: 11.1 %
NEUTROPHILS # BLD AUTO: 12.97 X10(3)/MCL (ref 2.1–9.2)
NEUTROPHILS NFR BLD AUTO: 82.6 %
NRBC BLD AUTO-RTO: 0 %
OSMOLALITY UR: 189 MOSM/KG (ref 300–1300)
PLATELET # BLD AUTO: 312 X10(3)/MCL (ref 130–400)
PMV BLD AUTO: 9.4 FL (ref 7.4–10.4)
POTASSIUM SERPL-SCNC: 4.3 MMOL/L (ref 3.5–5.1)
PROT SERPL-MCNC: 6.8 GM/DL (ref 6.4–8.3)
PSYCHE PATHOLOGY RESULT: NORMAL
RBC # BLD AUTO: 4.2 X10(6)/MCL (ref 4.7–6.1)
SODIUM SERPL-SCNC: 130 MMOL/L (ref 136–145)
SODIUM SERPL-SCNC: 131 MMOL/L (ref 136–145)
SODIUM UR-SCNC: 23 MMOL/L
WBC # SPEC AUTO: 15.71 X10(3)/MCL (ref 4.5–11.5)

## 2024-05-21 PROCEDURE — 83935 ASSAY OF URINE OSMOLALITY: CPT | Performed by: INTERNAL MEDICINE

## 2024-05-21 PROCEDURE — 84295 ASSAY OF SERUM SODIUM: CPT | Performed by: INTERNAL MEDICINE

## 2024-05-21 PROCEDURE — 25000003 PHARM REV CODE 250: Performed by: INTERNAL MEDICINE

## 2024-05-21 PROCEDURE — 36415 COLL VENOUS BLD VENIPUNCTURE: CPT | Performed by: INTERNAL MEDICINE

## 2024-05-21 PROCEDURE — 80053 COMPREHEN METABOLIC PANEL: CPT | Performed by: INTERNAL MEDICINE

## 2024-05-21 PROCEDURE — 99232 SBSQ HOSP IP/OBS MODERATE 35: CPT | Mod: ,,, | Performed by: INTERNAL MEDICINE

## 2024-05-21 PROCEDURE — 84300 ASSAY OF URINE SODIUM: CPT | Performed by: INTERNAL MEDICINE

## 2024-05-21 PROCEDURE — 85025 COMPLETE CBC W/AUTO DIFF WBC: CPT | Performed by: INTERNAL MEDICINE

## 2024-05-21 PROCEDURE — 63600175 PHARM REV CODE 636 W HCPCS: Performed by: INTERNAL MEDICINE

## 2024-05-21 RX ORDER — ACETAMINOPHEN 325 MG/1
650 TABLET ORAL EVERY 8 HOURS PRN
Qty: 30 TABLET | Refills: 0 | Status: SHIPPED | OUTPATIENT
Start: 2024-05-21

## 2024-05-21 RX ORDER — HYDROCODONE BITARTRATE AND ACETAMINOPHEN 5; 325 MG/1; MG/1
1 TABLET ORAL EVERY 6 HOURS PRN
Qty: 20 TABLET | Refills: 0 | Status: SHIPPED | OUTPATIENT
Start: 2024-05-21 | End: 2024-05-28

## 2024-05-21 RX ORDER — ONDANSETRON 4 MG/1
4 TABLET, ORALLY DISINTEGRATING ORAL EVERY 12 HOURS PRN
Qty: 30 TABLET | Refills: 0 | Status: SHIPPED | OUTPATIENT
Start: 2024-05-21 | End: 2024-05-28

## 2024-05-21 RX ORDER — POLYETHYLENE GLYCOL 3350 17 G/17G
17 POWDER, FOR SOLUTION ORAL DAILY PRN
Qty: 20 PACKET | Refills: 0 | Status: SHIPPED | OUTPATIENT
Start: 2024-05-21 | End: 2024-05-28

## 2024-05-21 RX ORDER — HYOSCYAMINE SULFATE 0.12 MG/1
0.12 TABLET SUBLINGUAL EVERY 6 HOURS PRN
Qty: 30 TABLET | Refills: 0 | Status: SHIPPED | OUTPATIENT
Start: 2024-05-21 | End: 2024-05-28

## 2024-05-21 RX ORDER — CEPHALEXIN 500 MG/1
500 CAPSULE ORAL EVERY 8 HOURS
Qty: 12 CAPSULE | Refills: 0 | Status: SHIPPED | OUTPATIENT
Start: 2024-05-21 | End: 2024-05-25

## 2024-05-21 RX ORDER — AMOXICILLIN 250 MG
2 CAPSULE ORAL 2 TIMES DAILY PRN
Qty: 30 TABLET | Refills: 0 | Status: SHIPPED | OUTPATIENT
Start: 2024-05-21 | End: 2024-05-28

## 2024-05-21 RX ORDER — HYDRALAZINE HYDROCHLORIDE 25 MG/1
50 TABLET, FILM COATED ORAL EVERY 8 HOURS
Qty: 180 TABLET | Refills: 0 | Status: SHIPPED | OUTPATIENT
Start: 2024-05-21

## 2024-05-21 RX ADMIN — ONDANSETRON 4 MG: 2 INJECTION INTRAMUSCULAR; INTRAVENOUS at 07:05

## 2024-05-21 RX ADMIN — AMLODIPINE BESYLATE 10 MG: 5 TABLET ORAL at 08:05

## 2024-05-21 RX ADMIN — FOLIC ACID 1 MG: 1 TABLET ORAL at 08:05

## 2024-05-21 RX ADMIN — HYDRALAZINE HYDROCHLORIDE 25 MG: 25 TABLET, FILM COATED ORAL at 05:05

## 2024-05-21 RX ADMIN — THERA TABS 1 TABLET: TAB at 08:05

## 2024-05-21 RX ADMIN — SODIUM CHLORIDE 1000 MG: 1 TABLET ORAL at 08:05

## 2024-05-21 RX ADMIN — THIAMINE HCL TAB 100 MG 200 MG: 100 TAB at 08:05

## 2024-05-21 RX ADMIN — Medication 400 MG: at 08:05

## 2024-05-21 NOTE — PROGRESS NOTES
.UROLOGY  PROGRESS  NOTE    Elan Campbellsherice Montgomery 1965  [id number]  5/21/2024    Patient sitting up in bed  Denies complaints  VSS, afebrile  No urine output charted      Exam:    NAD  Card RRR  Resp unlabored  Abd soft, NTND   no urine for assessment      Recent Results (from the past 24 hour(s))   Sodium    Collection Time: 05/20/24 12:24 PM   Result Value Ref Range    Sodium 128 (L) 136 - 145 mmol/L   Sodium    Collection Time: 05/20/24  5:53 PM   Result Value Ref Range    Sodium 131 (L) 136 - 145 mmol/L   Sodium    Collection Time: 05/21/24 12:04 AM   Result Value Ref Range    Sodium 131 (L) 136 - 145 mmol/L   Comprehensive Metabolic Panel    Collection Time: 05/21/24  3:20 AM   Result Value Ref Range    Sodium 130 (L) 136 - 145 mmol/L    Potassium 4.3 3.5 - 5.1 mmol/L    Chloride 99 98 - 107 mmol/L    CO2 21 (L) 22 - 29 mmol/L    Glucose 89 74 - 100 mg/dL    Blood Urea Nitrogen 11.1 8.4 - 25.7 mg/dL    Creatinine 1.31 (H) 0.73 - 1.18 mg/dL    Calcium 8.8 8.4 - 10.2 mg/dL    Protein Total 6.8 6.4 - 8.3 gm/dL    Albumin 3.4 (L) 3.5 - 5.0 g/dL    Globulin 3.4 2.4 - 3.5 gm/dL    Albumin/Globulin Ratio 1.0 (L) 1.1 - 2.0 ratio    Bilirubin Total 0.5 <=1.5 mg/dL    ALP 62 40 - 150 unit/L    ALT 19 0 - 55 unit/L    AST 32 5 - 34 unit/L    eGFR >60 mL/min/1.73/m2    Anion Gap 10.0 mEq/L    BUN/Creatinine Ratio 8    CBC with Differential    Collection Time: 05/21/24  3:20 AM   Result Value Ref Range    WBC 15.71 (H) 4.50 - 11.50 x10(3)/mcL    RBC 4.20 (L) 4.70 - 6.10 x10(6)/mcL    Hgb 13.1 (L) 14.0 - 18.0 g/dL    Hct 37.5 (L) 42.0 - 52.0 %    MCV 89.3 80.0 - 94.0 fL    MCH 31.2 (H) 27.0 - 31.0 pg    MCHC 34.9 33.0 - 36.0 g/dL    RDW 11.8 11.5 - 17.0 %    Platelet 312 130 - 400 x10(3)/mcL    MPV 9.4 7.4 - 10.4 fL    Neut % 82.6 %    Lymph % 5.7 %    Mono % 11.1 %    Eos % 0.1 %    Basophil % 0.2 %    Lymph # 0.90 0.6 - 4.6 x10(3)/mcL    Neut # 12.97 (H) 2.1 - 9.2 x10(3)/mcL    Mono # 1.75 (H) 0.1 - 1.3 x10(3)/mcL     Eos # 0.01 0 - 0.9 x10(3)/mcL    Baso # 0.03 <=0.2 x10(3)/mcL    IG# 0.05 (H) 0 - 0.04 x10(3)/mcL    IG% 0.3 %    NRBC% 0.0 %   Sodium, Random Urine    Collection Time: 05/21/24  6:48 AM   Result Value Ref Range    Urine Sodium 23.0 mmol/L   Osmolality, Urine    Collection Time: 05/21/24  6:48 AM   Result Value Ref Range    Urine Osmolality 189 (L) 300 - 1,300 mOsm/kg       Imaging:  EXAMINATION:  MRI ABDOMEN W WO CONTRAST    CLINICAL HISTORY:  Renal mass;tumor thrombus;    TECHNIQUE:  Multiplanar, multisequence MR imaging of the abdomen before and after IV contrast administration.    COMPARISON:  CT 05/18/2024    FINDINGS:  Image quality mildly degraded by motion.    Heterogeneous right renal mass measures 10.3 x 9.4 x 8.8 cm.  Soft tissue from the mass extends into the right renal vein, but no definitive extension into the IVC.  No enhancing left renal lesion.    No suspicious liver lesion or biliary dilatation.  Normal spleen, pancreas and adrenal glands.  No significant ascites.  No abdominal lymphadenopathy or aggressive osseous lesion identified.   Impression:       Heterogeneous 10 cm right renal mass with extension into the right renal vein.  No IVC extension appreciated.      Electronically signed by: Jake King  Date: 05/21/2024  Time: 08:13       Assessment:  58-year-old male with large right renal mass concerning for primary renal malignancy with tumor thrombus extension into renal vein  -discussed results and plan with patient  -there is no evidence of metastasis       Plan:  Patient can be discharged from a  standpoint. Will have him follow up for surgical plan as an outpatient.     Annie Ribeiro, YOJANA

## 2024-05-21 NOTE — PROGRESS NOTES
[facility] Nephrology Inpatient Progress Note      HPI:     Patient Name: Elan Lentz Jr.  Admission Date: 5/18/2024  Hospital Length of Stay: 1 days  Code Status: Full Code   Attending Physician: Anahy Kidd MD   Primary Care Physician: No, Primary Doctor  Principal Problem:Renal mass, right      Today patient seen and examined  Labs, recent events, imaging and medications reviewed for this hospital stay  Feels better.  No new complaints.    Review of Systems:   Constitutional: Denies fever, fatigue, generalized weakness, night sweats, or acute weight change  Skin: Denies wounds, no rashes, no itching, no new skin lesions  HEENT: Denies acute change in hearing or vision, tinnitus, or dysphagia  Respiratory:  Denies cough, shortness of breath, or wheezing  Cardiovascular: Denies chest pain, palpitations, or swelling  Gastrointestional: Denies abdominal pain, nausea, vomiting, diarrhea, or constipation  Genitourinary: Denies dysuria, hematuria, or incontinence; reports able to empty bladder  Musculoskeletal: Denies myalgias, back pain, flank pain, new decreased ROM or acute focal weakness  Neurological: Denies headaches, seizures, dizziness, paresthesias or asterixis  Hematological: Denies unusual bruising or bleeding  Psychiatric: Denies hallucinations, depression, or confusion      Medications:   Scheduled Meds:   amLODIPine  10 mg Oral Daily    cefTRIAXone (Rocephin) IV (PEDS and ADULTS)  1 g Intravenous Q24H    folic acid  1 mg Oral Daily    hydrALAZINE  25 mg Oral Q8H    magnesium oxide  400 mg Oral BID    multivitamin  1 tablet Oral Daily    sodium chloride  1,000 mg Oral BID    thiamine  200 mg Oral Daily     Continuous Infusions:   sodium chloride 0.9%   Intravenous Continuous 50 mL/hr at 05/20/24 1021 Rate Change at 05/20/24 1021         Vitals:     Vitals:    05/20/24 1915 05/21/24 0036 05/21/24 0426 05/21/24 0749   BP: (!) 154/83 (!) 145/88 (!) 159/91 (!) 144/83   Pulse: 82 90 85 73   Resp:  18 18 18    Temp: 98.3 °F (36.8 °C) 97.8 °F (36.6 °C) 97.7 °F (36.5 °C) 98.2 °F (36.8 °C)   TempSrc: Oral Oral Oral Oral   SpO2: 97% 96% 97% 98%   Weight:       Height:             I/O last 3 completed shifts:  In: 1412.5 [P.O.:480; I.V.:837; IV Piggyback:95.5]  Out: 1200 [Urine:1200]  No intake or output data in the 24 hours ending 05/21/24 1012    Physical Exam:   General: no acute distress, awake, alert  Eyes: PERRLA, EOMI, conjunctiva clear, eyelids without swelling  HENT: atraumatic, oropharynx and nasal mucosa patent  Neck: full ROM, no JVD, no thyromegaly or lymphadenopathy  Respiratory: equal, unlabored, clear to auscultation A/P  Cardiovascular: RRR without murmur or rub; BL radial and pedal pulses felt  Edema: none  Gastrointestinal: soft, non-tender, non-distended; positive bowel sounds; no masses to palpation  Genitourinary: no CVA tenderness upon palpation  Musculoskeletal: full ROM without limitation or discomfort  Integumentary: warm, dry; no rashes, wounds, or skin lesions  Neurological: oriented, appropriate, no acute deficits      Labs:     Chemistries:   Recent Labs   Lab 05/18/24  1847 05/19/24  0644 05/19/24  1255 05/20/24  0319 05/20/24  1224 05/20/24  1753 05/21/24  0004 05/21/24  0320   * 120*   < > 125*   < > 131* 131* 130*   K 4.1 4.4  --  4.6  --   --   --  4.3   CO2 19* 18*  --  21*  --   --   --  21*   BUN 10.2 8.3*  --  9.0  --   --   --  11.1   CREATININE 1.22* 1.32*  --  1.40*  --   --   --  1.31*   CALCIUM 9.2 8.4  --  8.5  --   --   --  8.8   BILITOT 0.6 0.8  --  0.7  --   --   --  0.5   ALKPHOS 71 73  --  66  --   --   --  62   ALT 25 22  --  21  --   --   --  19   AST 37* 30  --  33  --   --   --  32   GLUCOSE 84 134*  --  108*  --   --   --  89   MG 2.00 2.60  --  2.50  --   --   --   --    PHOS  --  1.9*  --  4.7  --   --   --   --     < > = values in this interval not displayed.        CBC/Anemia Labs: Coags:    Recent Labs   Lab 05/19/24  0644 05/20/24  0319 05/21/24  0320   WBC  12.57* 16.81* 15.71*   HGB 13.1* 12.6* 13.1*   HCT 36.1* 36.4* 37.5*    296 312   MCV 86.2 88.3 89.3   RDW 11.2* 11.5 11.8    Recent Labs   Lab 05/18/24  2331   INR 1.0          Impression:     Patient Active Problem List   Diagnosis    Renal mass, right    Hematuria    Hyponatremia     Hyponatremia related to beer potomania improved with fluid restriction salt tablets  Patient did require 2 doses of tolvaptan due to severity of hyponatremia currently sodium corrected appropriately  Renal cell carcinoma for nephrectomy soon as outpatient  Plan:   Renal wise okay to be discharged  Fluid restriction 1 L a day and avoiding excessive beer intake advised  Patient will be scheduled for outpatient nephrectomy  If need to we will be more than happy to follow-up as outpatient within 2 weeks           Layla Anthony

## 2024-05-21 NOTE — DISCHARGE SUMMARY
[facility] Hospital Medicine Discharge Summary    Admit Date: 5/18/2024  Discharge Date and Time: 5/21/20245:16 PM  Admitting Physician: GRZEGORZ Team  Discharging Physician: Anahy Kidd MD.  Primary Care Physician: Maylin, Primary Doctor  Consults: Nephrology and Urology    Discharge Diagnoses:  Large right renal mass consistent with RCC  Acute UTI  Cystitis and possible right ureteritis & pyelonephritis  Hematuria secondary to renal mass and UTI  Hyponatremia  Suspect beer potomania  FRED versus CKD2  Hypertension, uncontrolled  Heavy alcohol use/beer       Hospital Course:   This is a 58-year-old male with medical history of hypertension, 4-5 beers daily alcohol consumption present to the ED with complaint of suprapubic pain, burning urination and hematuria for 2 days.  Reports similar episode few months back and took Pyridium in his symptom resolved.  No other episode of hematuria until now.  Denies weight loss or night sweats.     On arrival to ED he was afebrile, hypertensive and saturating 97% on room air.  Labs notable for WBC 11.37, hemoglobin 13.6, platelets 301, INR 1.0, normal PTT, sodium 122, chloride 89, CO2 19, creatinine 1.22, BUN 10.2, normal total bilirubin, AST 37, albumin 4.0, urinalysis show hematuria with 50-99 RBC, 11-20 WBC and occasional bacteria no nitrites.     CT abdomen pelvis show right renal large heterogeneous mass measuring 10.9 x 9.6 x 11.8 cm consistent with renal cell carcinoma, the mass and rounded by inflammatory stranding, and encroaches through the cortex and reach the right renal pelvis which is slightly dilated, mild dilation of the right ureter and slight periureteral inflammation No tumor thrombus in the right renal vein or IVC, subcentimeter retroperitoneal and periaortic lymph nodes, urinary bladder wall is diffusely thickened     He was given 2 L of normal saline, ceftriaxone, analgesics and referred to hospital medicine service for further  evaluation and managemen     Urology on board, opines that patient will ultimately require right nephrectomy, recommending MRA/MRV to make clear the vein is patent and also CT of the chest .  CT of the chest with no malignancy seen , previous granulomatous disease noted.  Oncology was consulted per Urology's recommendation.  Nephrology was consulted for hyponatremia    Sodium eventually improved, initiated on salt tablets, put on fluid restriction, received tolvaptan.  Nephrology cleared the patient for discharge.  MRI was eventually done, report reviewed by Urology, cleared for discharge.  Oncology would follow up outpatient based on the pathology after surgery     Pt was seen and examined on the day of discharge. Patient was stable on discharge,all questions were answered and emphasized the importance of follow ups.    Vitals:  VITAL SIGNS: 24 HRS MIN & MAX LAST   Temp  Min: 97.7 °F (36.5 °C)  Max: 98.3 °F (36.8 °C) 98.1 °F (36.7 °C)   BP  Min: 144/83  Max: 159/91 (!) 158/91   Pulse  Min: 73  Max: 90  79   Resp  Min: 18  Max: 18 18   SpO2  Min: 96 %  Max: 98 % 97 %       Physical Exam:  General: In no acute distress, afebrile  Chest: Clear to auscultation bilaterally  Heart: +S1, S2  Abdomen: Soft, nontender, BS +  MSK: Warm, no lower extremity edema, no clubbing or cyanosis  Neurologic: Alert and oriented, Strength 5/5 in all 4 extremities    Procedures Performed: see full chart    Significant Diagnostic Studies: See Full reports for all details    Recent Labs   Lab 05/19/24  0644 05/20/24  0319 05/21/24  0320   WBC 12.57* 16.81* 15.71*   RBC 4.19* 4.12* 4.20*   HGB 13.1* 12.6* 13.1*   HCT 36.1* 36.4* 37.5*   MCV 86.2 88.3 89.3   MCH 31.3* 30.6 31.2*   MCHC 36.3* 34.6 34.9   RDW 11.2* 11.5 11.8    296 312   MPV 9.2 9.4 9.4       Recent Labs   Lab 05/18/24  1847 05/19/24  0644 05/19/24  1101 05/19/24  1255 05/20/24  0319 05/20/24  1224 05/20/24  1753 05/21/24  0004 05/21/24  0320   * 120*  --    < >  125*   < > 131* 131* 130*   K 4.1 4.4  --   --  4.6  --   --   --  4.3   CO2 19* 18*  --   --  21*  --   --   --  21*   BUN 10.2 8.3*  --   --  9.0  --   --   --  11.1   CREATININE 1.22* 1.32*  --   --  1.40*  --   --   --  1.31*   CALCIUM 9.2 8.4  --   --  8.5  --   --   --  8.8   PH  --   --  7.420  --   --   --   --   --   --    MG 2.00 2.60  --   --  2.50  --   --   --   --    ALBUMIN 4.0 3.7  --   --  3.5  --   --   --  3.4*   ALKPHOS 71 73  --   --  66  --   --   --  62   ALT 25 22  --   --  21  --   --   --  19   AST 37* 30  --   --  33  --   --   --  32   BILITOT 0.6 0.8  --   --  0.7  --   --   --  0.5    < > = values in this interval not displayed.        Microbiology Results (last 7 days)       Procedure Component Value Units Date/Time    Blood culture #1 **CANNOT BE ORDERED STAT** [[id number]]  (Normal) Collected: 05/18/24 2132    Order Status: Completed Specimen: Blood Updated: 05/20/24 2300     Blood Culture No Growth At 48 Hours    Blood culture #2 **CANNOT BE ORDERED STAT** [[id number]]  (Normal) Collected: 05/18/24 2132    Order Status: Completed Specimen: Blood Updated: 05/20/24 2201     Blood Culture No Growth At 48 Hours    Urine culture [[id number]] Collected: 05/18/24 1840    Order Status: Completed Specimen: Urine Updated: 05/20/24 0856     Urine Culture No Significant Growth             MRI Abdomen W WO Contrast  Narrative: EXAMINATION:  MRI ABDOMEN W WO CONTRAST    CLINICAL HISTORY:  Renal mass;tumor thrombus;    TECHNIQUE:  Multiplanar, multisequence MR imaging of the abdomen before and after IV contrast administration.    COMPARISON:  CT 05/18/2024    FINDINGS:  Image quality mildly degraded by motion.    Heterogeneous right renal mass measures 10.3 x 9.4 x 8.8 cm.  Soft tissue from the mass extends into the right renal vein, but no definitive extension into the IVC.  No enhancing left renal lesion.    No suspicious liver lesion or biliary dilatation.  Normal spleen, pancreas and adrenal  glands.  No significant ascites.  No abdominal lymphadenopathy or aggressive osseous lesion identified.  Impression: Heterogeneous 10 cm right renal mass with extension into the right renal vein.  No IVC extension appreciated.    Electronically signed by: Jake King  Date:    05/21/2024  Time:    08:13         Medication List        START taking these medications      acetaminophen 325 MG tablet  Commonly known as: TYLENOL  Take 2 tablets (650 mg total) by mouth every 8 (eight) hours as needed for Pain or Temperature greater than (100.4).     cephALEXin 500 MG capsule  Commonly known as: KEFLEX  Take 1 capsule (500 mg total) by mouth every 8 (eight) hours. for 4 days     hydrALAZINE 25 MG tablet  Commonly known as: APRESOLINE  Take 2 tablets (50 mg total) by mouth every 8 (eight) hours. Avoid sbp<110     HYDROcodone-acetaminophen 5-325 mg per tablet  Commonly known as: NORCO  Take 1 tablet by mouth every 6 (six) hours as needed for Pain.     hyoscyamine 0.125 mg Subl ODT tablet  Commonly known as: Levsin  Place 1 tablet (0.125 mg total) under the tongue every 6 (six) hours as needed (bladder spasms).     ondansetron 4 MG Tbdl  Commonly known as: ZOFRAN-ODT  Take 1 tablet (4 mg total) by mouth every 12 (twelve) hours as needed (nausea).     polyethylene glycol 17 gram Pwpk  Commonly known as: GLYCOLAX  Take 17 g by mouth daily as needed for Constipation ((Second Choice)).     senna-docusate 8.6-50 mg 8.6-50 mg per tablet  Commonly known as: PERICOLACE  Take 2 tablets by mouth 2 (two) times daily as needed for Constipation ((First Choice)).            CONTINUE taking these medications      amLODIPine 10 MG tablet  Commonly known as: NORVASC            STOP taking these medications      benazepriL 40 MG tablet  Commonly known as: LOTENSIN               Where to Get Your Medications        These medications were sent to [facility]/pharm[facility] #[id number] - [place] - [address] [place]  [address],  [place] [id number]      Phone: [phone]   acetaminophen 325 MG tablet  cephALEXin 500 MG capsule  hydrALAZINE 25 MG tablet  HYDROcodone-acetaminophen 5-325 mg per tablet  hyoscyamine 0.125 mg Subl ODT tablet  ondansetron 4 MG Tbdl  polyethylene glycol 17 gram Pwpk  senna-docusate 8.6-50 mg 8.6-50 mg per tablet          Explained in detail to the patient about the discharge plan, medications, and follow-up visits. Pt understands and agrees with the treatment plan  Discharge Disposition: Home or Self Care   Discharged Condition: stable  Diet-    Medications Per DC med rec  Activities as tolerated   Follow-up Information       Alba Holm MD Follow up in 1 week(s).    Specialties: Family Medicine, Urgent Care  Why: Follow-up scheduled for 5/28/24 @ 7:45 AM.  Contact information:  [address] [place] [address] [place] [address]  [phone]                           For further questions contact hospitalist office    Discharge time 33 minutes    For worsening symptoms, chest pain, shortness of breath, increased abdominal pain, high grade fever, stroke or stroke like symptoms, immediately go to the nearest Emergency Room or call 911 as soon as possible.      Anahy Pollard M.D, on 5/21/2024. at 5:17 PM.

## 2024-05-21 NOTE — PLAN OF CARE
05/21/24 1405   Discharge Assessment   Assessment Type Discharge Planning Assessment   Confirmed/corrected address, phone number and insurance Yes   Confirmed Demographics Correct on Facesheet   Source of Information patient   Communicated TRUE with patient/caregiver Date not available/Unable to determine   Reason For Admission Dysuria   People in Home spouse   Do you expect to return to your current living situation? Yes   Do you have help at home or someone to help you manage your care at home? Yes   Who are your caregiver(s) and their phone number(s)? Florina (significant other) [phone]   Prior to hospitilization cognitive status: Unable to Assess   Current cognitive status: Alert/Oriented   Walking or Climbing Stairs Difficulty no   Dressing/Bathing Difficulty no   Home Accessibility wheelchair accessible   Home Layout Able to live on 1st floor   Equipment Currently Used at Home none   Do you currently have service(s) that help you manage your care at home? No   Do you take prescription medications? Yes   Do you have prescription coverage? Yes   Coverage [employer]   Who is going to help you get home at discharge? self   How do you get to doctors appointments? car, drives self   Are you on dialysis? No   Do you take coumadin? No   Discharge Plan A Home   Discharge Plan B Home   DME Needed Upon Discharge  none   Discharge Plan discussed with: Patient   Transition of Care Barriers None   Physical Activity   On average, how many days per week do you engage in moderate to strenuous exercise (like a brisk walk)? 0 days   On average, how many minutes do you engage in exercise at this level? 0 min   Financial Resource Strain   How hard is it for you to pay for the very basics like food, housing, medical care, and heating? Not hard   Housing Stability   In the last 12 months, was there a time when you were not able to pay the mortgage or rent on time? N   At any time in the past 12 months, were you homeless  or living in a shelter (including now)? N   Transportation Needs   Has the lack of transportation kept you from medical appointments, meetings, work or from getting things needed for daily living? No   Food Insecurity   Within the past 12 months, you worried that your food would run out before you got the money to buy more. Never true   Within the past 12 months, the food you bought just didn't last and you didn't have money to get more. Never true   Stress   Do you feel stress - tense, restless, nervous, or anxious, or unable to sleep at night because your mind is troubled all the time - these days? Not at all   Social Isolation   How often do you feel lonely or isolated from those around you?  Never   Alcohol Use   Q1: How often do you have a drink containing alcohol? 4 or more ti   Q2: How many drinks containing alcohol do you have on a typical day when you are drinking? 3 or 4   Q3: How often do you have six or more drinks on one occasion? Never   Utilities   In the past 12 months has the electric, gas, oil, or water company threatened to shut off services in your home? No   Health Literacy   How often do you need to have someone help you when you read instructions, pamphlets, or other written material from your doctor or pharmacy? Never   OTHER   Name(s) of People in Home Florina [place]     [facility]. No needs from  at this time, Expected to dc home today.

## 2024-05-23 LAB
BACTERIA BLD CULT: NORMAL
BACTERIA BLD CULT: NORMAL

## 2024-05-28 ENCOUNTER — OFFICE VISIT (OUTPATIENT)
Dept: FAMILY MEDICINE | Facility: CLINIC | Age: 59
End: 2024-05-28
Payer: COMMERCIAL

## 2024-05-28 VITALS
HEART RATE: 92 BPM | OXYGEN SATURATION: 98 % | BODY MASS INDEX: 36.42 KG/M2 | HEIGHT: 66 IN | TEMPERATURE: 98 F | RESPIRATION RATE: 18 BRPM | WEIGHT: 226.63 LBS | DIASTOLIC BLOOD PRESSURE: 82 MMHG | SYSTOLIC BLOOD PRESSURE: 162 MMHG

## 2024-05-28 DIAGNOSIS — Z13.6 ENCOUNTER FOR LIPID SCREENING FOR CARDIOVASCULAR DISEASE: ICD-10-CM

## 2024-05-28 DIAGNOSIS — Z13.220 ENCOUNTER FOR LIPID SCREENING FOR CARDIOVASCULAR DISEASE: ICD-10-CM

## 2024-05-28 DIAGNOSIS — Z00.00 ENCOUNTER FOR WELLNESS EXAMINATION: ICD-10-CM

## 2024-05-28 DIAGNOSIS — F10.20 ALCOHOLISM: ICD-10-CM

## 2024-05-28 DIAGNOSIS — Z12.5 SCREENING PSA (PROSTATE SPECIFIC ANTIGEN): ICD-10-CM

## 2024-05-28 DIAGNOSIS — E87.1 HYPONATREMIA: ICD-10-CM

## 2024-05-28 DIAGNOSIS — Z11.3 ROUTINE SCREENING FOR STI (SEXUALLY TRANSMITTED INFECTION): ICD-10-CM

## 2024-05-28 DIAGNOSIS — N28.89 RENAL MASS, RIGHT: ICD-10-CM

## 2024-05-28 DIAGNOSIS — Z13.1 SCREENING FOR DIABETES MELLITUS: Primary | ICD-10-CM

## 2024-05-28 DIAGNOSIS — I10 PRIMARY HYPERTENSION: ICD-10-CM

## 2024-05-28 PROCEDURE — 1160F RVW MEDS BY RX/DR IN RCRD: CPT | Mod: CPTII,,, | Performed by: FAMILY MEDICINE

## 2024-05-28 PROCEDURE — 3008F BODY MASS INDEX DOCD: CPT | Mod: CPTII,,, | Performed by: FAMILY MEDICINE

## 2024-05-28 PROCEDURE — 99204 OFFICE O/P NEW MOD 45 MIN: CPT | Mod: ,,, | Performed by: FAMILY MEDICINE

## 2024-05-28 PROCEDURE — 1111F DSCHRG MED/CURRENT MED MERGE: CPT | Mod: CPTII,,, | Performed by: FAMILY MEDICINE

## 2024-05-28 PROCEDURE — 4010F ACE/ARB THERAPY RXD/TAKEN: CPT | Mod: CPTII,,, | Performed by: FAMILY MEDICINE

## 2024-05-28 PROCEDURE — 1159F MED LIST DOCD IN RCRD: CPT | Mod: CPTII,,, | Performed by: FAMILY MEDICINE

## 2024-05-28 PROCEDURE — 3077F SYST BP >= 140 MM HG: CPT | Mod: CPTII,,, | Performed by: FAMILY MEDICINE

## 2024-05-28 PROCEDURE — 3079F DIAST BP 80-89 MM HG: CPT | Mod: CPTII,,, | Performed by: FAMILY MEDICINE

## 2024-05-28 NOTE — PATIENT INSTRUCTIONS
Recommend a low salt diet, weight loss and exercise  Avoid drinking too much caffeine  Take blood pressure medications 2-3 hours BEFORE every appointment so we can get an accurate reading in the office  Check your blood pressure twice a day and write it down. Bring blood pressure log and blood pressure cuff to next visit  Call me with pressure more than 140/90 or less than 100/60

## 2024-05-28 NOTE — PROGRESS NOTES
Elan Lentz .  05/28/2024  [id number]    Alba Holm MD  Patient Care Team:  Alba Holm MD as PCP - General (Family Medicine)      Chief Complaint:  Chief Complaint   Patient presents with    Establish Care     Hospital discharge follow up       History of Present Illness:     58 y.o. male who presents today for hospital discharge follow up and to establish care. He was admitted for hematuria which after work up resulted in finding a large right renal mass consistent with RCC. He also is a heavy beer drinker and had hyponatremia, for which salt tablets and fluid restriction was initiated. He has f/u with urology and will require a total nephrectomy. CT chest neg for mets. He is not taking bp meds properly, only taking hydralazine, not the norvasc. He has no concerns or complaints today. Labs revealing of FRED vs CKD.     Review of Systems  General: denies f/c, weight loss, night sweats, decreased appetite  Eye: denies blurred vision, changes in vision  Respiratory: denies sob, wheezing, cough  Cardiovascular: denies chest pain, palpitations, edema  Gastrointestinal: denies abdominal pain, n/v, constipation, diarrhea  Integumentary: denies rashes, pruritis    Past Medical History  Past Medical History:   Diagnosis Date    Hypertension     Renal mass, right        Medications  Medication List with Changes/Refills   Current Medications    ACETAMINOPHEN (TYLENOL) 325 MG TABLET    Take 2 tablets (650 mg total) by mouth every 8 (eight) hours as needed for Pain or Temperature greater than (100.4).    AMLODIPINE (NORVASC) 10 MG TABLET    Take 10 mg by mouth once daily.    HYDRALAZINE (APRESOLINE) 25 MG TABLET    Take 2 tablets (50 mg total) by mouth every 8 (eight) hours. Avoid sbp<110   Discontinued Medications    HYDROCODONE-ACETAMINOPHEN (NORCO) 5-325 MG PER TABLET    Take 1 tablet by mouth every 6 (six) hours as needed for Pain.    HYOSCYAMINE (LEVSIN) 0.125 MG SUBL ODT TABLET    Place 1  "tablet (0.125 mg total) under the tongue every 6 (six) hours as needed (bladder spasms).    ONDANSETRON (ZOFRAN-ODT) 4 MG TBDL    Take 1 tablet (4 mg total) by mouth every 12 (twelve) hours as needed (nausea).    POLYETHYLENE GLYCOL (GLYCOLAX) 17 GRAM PWPK    Take 17 g by mouth daily as needed for Constipation ((Second Choice)).    SENNA-DOCUSATE 8.6-50 MG (PERICOLACE) 8.6-50 MG PER TABLET    Take 2 tablets by mouth 2 (two) times daily as needed for Constipation ((First Choice)).       Past Surgical History:   Procedure Laterality Date    APPENDECTOMY         SUBJECTIVE:  Health Maintenance  Health Maintenance Topics with due status: Not Due       Topic Last Completion Date    Influenza Vaccine Not Due     Health Maintenance Due   Topic Date Due    Hepatitis C Screening  Never done    Lipid Panel  Never done    HIV Screening  Never done    TETANUS VACCINE  Never done    Hemoglobin A1c (Diabetic Prevention Screening)  Never done    Colorectal Cancer Screening  Never done    Shingles Vaccine (1 of 2) Never done    COVID-19 Vaccine (1 - 2023-24 season) Never done       Exam:  Vitals:    05/28/24 0747   BP: (!) 164/82   BP Location: Left arm   Patient Position: Sitting   BP Method: Large (Manual)   Pulse: 92   Resp: 18   Temp: 98.4 °F (36.9 °C)   TempSrc: Oral   SpO2: 98%   Weight: 102.8 kg (226 lb 9.6 oz)   Height: 5' 6" (1.676 m)     Weight: 102.8 kg (226 lb 9.6 oz)   Body mass index is 36.57 kg/m².      Physical Exam  Constitutional: NAD, alert, pleasant  Respiratory: CTAB, no wheezes, rales or rhonchi. No accessory muscle use  Eyes: EOMI  Cardiovascular: RRR, No m/r/g. No JVD. No LE edema  Integumentary: warm, dry, intact  Psych: AA&Ox3      ICD-10-CM ICD-9-CM   1. Screening for diabetes mellitus  Z13.1 V77.1   2. Renal mass, right  N28.89 593.9   3. Hyponatremia  E87.1 276.1   4. Primary hypertension  I10 401.9   5. Alcoholism  F10.20 303.90   6. Encounter for wellness examination  Z00.00 V70.0   7. Routine " screening for STI (sexually transmitted infection)  Z11.3 V74.5   8. Screening PSA (prostate specific antigen)  Z12.5 V76.44   9. Encounter for lipid screening for cardiovascular disease  Z13.220 V77.91    Z13.6 V81.2       1. Screening for diabetes mellitus  -     Hemoglobin A1C; Future; Expected date: 05/28/2024    2. Renal mass, right  Overview:  Patient has f/u with nephrology    Orders:  -     CBC Auto Differential; Future; Expected date: 05/28/2024  -     Comprehensive Metabolic Panel; Future; Expected date: 05/28/2024  -     Lipid Panel; Future; Expected date: 05/28/2024  -     TSH; Future; Expected date: 05/28/2024  -     Hemoglobin A1C; Future; Expected date: 05/28/2024  -     Urinalysis; Future; Expected date: 05/28/2024    3. Hyponatremia  Overview:  Likely beer potomania 2/2 alcoholism. Worked up by nephrology while inpaitnet.     Will need repeat labs in 2 wks    Orders:  -     Comprehensive Metabolic Panel; Future; Expected date: 05/28/2024    4. Primary hypertension  Overview:  Patient taking hydralazine tid  Restart amlodpine 10 mg daily  Rtc 2 wks    Orders:  -     CBC Auto Differential; Future; Expected date: 05/28/2024  -     Comprehensive Metabolic Panel; Future; Expected date: 05/28/2024    5. Alcoholism  Overview:  He drinks 5 beers per day resulting in beer potomania. Sodium as low as 117.     Cessation strongly encouraged      6. Encounter for wellness examination  -     CBC Auto Differential; Future; Expected date: 05/28/2024  -     Comprehensive Metabolic Panel; Future; Expected date: 05/28/2024  -     Lipid Panel; Future; Expected date: 05/28/2024  -     TSH; Future; Expected date: 05/28/2024  -     Hemoglobin A1C; Future; Expected date: 05/28/2024  -     Urinalysis; Future; Expected date: 05/28/2024  -     PSA, Screening; Future; Expected date: 05/28/2024    7. Routine screening for STI (sexually transmitted infection)  -     Hepatitis C Antibody; Future; Expected date: 05/28/2024  -      HIV 1/2 Ag/Ab (4th Gen); Future; Expected date: 05/28/2024  -     SYPHILIS ANTIBODY (WITH REFLEX RPR); Future; Expected date: 05/28/2024    8. Screening PSA (prostate specific antigen)  -     PSA, Screening; Future; Expected date: 05/28/2024    9. Encounter for lipid screening for cardiovascular disease  -     Lipid Panel; Future; Expected date: 05/28/2024         Follow up: Follow up for 2 wks wellness [facility] labs.      Care Plan/Goals: Reviewed   Goals    None

## 2024-07-03 ENCOUNTER — TELEPHONE (OUTPATIENT)
Dept: FAMILY MEDICINE | Facility: CLINIC | Age: 59
End: 2024-07-03
Payer: COMMERCIAL

## 2024-07-03 DIAGNOSIS — I10 PRIMARY HYPERTENSION: Primary | ICD-10-CM

## 2024-07-03 RX ORDER — HYDRALAZINE HYDROCHLORIDE 25 MG/1
50 TABLET, FILM COATED ORAL EVERY 8 HOURS
Qty: 180 TABLET | Refills: 0 | Status: SHIPPED | OUTPATIENT
Start: 2024-07-03

## 2024-07-03 NOTE — TELEPHONE ENCOUNTER
Pt rescheduled his 2 week f/u for HTN/Wellness with labs to 07/08/2024. I advised him that I could send in a 30 day supply of his medications. I advised him that he needs to keep his f/u on 07/08/2024 and make sure that his labs are done prior to the appt. Verbal understanding given. Script escribed.

## 2024-07-03 NOTE — TELEPHONE ENCOUNTER
----- Message from Rosy Sam sent at 7/3/2024  8:26 AM CDT -----  Regarding: refill  Who Called: Elan Lentz Jr.    Refill or New Rx:Refill  RX Name and Strength:hydrALAZINE (APRESOLINE) 25 MG tablet 180 tablet 0 5/21/2024 - No  Sig - Route: Take 2 tablets (50 mg total) by mouth every 8 (eight) hours.       How is the patient currently taking it? (ex. 1XDay):    Is this a 30 day or 90 day RX:    Local or Mail Order:local    [facility]/pharmacy #[id number] - [place] - [address] AT [facility]   Phone: [phone]  Fax: [phone]        Ordering Provider:        Preferred Method of Contact: Phone Call  Patient's Preferred Phone Number on File: [phone]   Best Call Back Number, if different:  Additional Information: refill request; pt states that he took his last tablet on this morning, he is completely out; please advise.

## 2024-07-05 ENCOUNTER — LAB VISIT (OUTPATIENT)
Dept: LAB | Facility: HOSPITAL | Age: 59
End: 2024-07-05
Attending: FAMILY MEDICINE
Payer: COMMERCIAL

## 2024-07-05 DIAGNOSIS — E87.1 HYPONATREMIA: ICD-10-CM

## 2024-07-05 DIAGNOSIS — Z11.3 ROUTINE SCREENING FOR STI (SEXUALLY TRANSMITTED INFECTION): ICD-10-CM

## 2024-07-05 DIAGNOSIS — Z12.5 SCREENING PSA (PROSTATE SPECIFIC ANTIGEN): ICD-10-CM

## 2024-07-05 DIAGNOSIS — I10 PRIMARY HYPERTENSION: ICD-10-CM

## 2024-07-05 DIAGNOSIS — N28.89 RENAL MASS, RIGHT: ICD-10-CM

## 2024-07-05 DIAGNOSIS — Z13.1 SCREENING FOR DIABETES MELLITUS: ICD-10-CM

## 2024-07-05 DIAGNOSIS — Z13.6 ENCOUNTER FOR LIPID SCREENING FOR CARDIOVASCULAR DISEASE: ICD-10-CM

## 2024-07-05 DIAGNOSIS — Z13.220 ENCOUNTER FOR LIPID SCREENING FOR CARDIOVASCULAR DISEASE: ICD-10-CM

## 2024-07-05 DIAGNOSIS — Z00.00 ENCOUNTER FOR WELLNESS EXAMINATION: ICD-10-CM

## 2024-07-05 LAB
ALBUMIN SERPL-MCNC: 3.9 G/DL (ref 3.5–5)
ALBUMIN/GLOB SERPL: 1 RATIO (ref 1.1–2)
ALP SERPL-CCNC: 69 UNIT/L (ref 40–150)
ALT SERPL-CCNC: 17 UNIT/L (ref 0–55)
ANION GAP SERPL CALC-SCNC: 13 MEQ/L
AST SERPL-CCNC: 23 UNIT/L (ref 5–34)
BASOPHILS # BLD AUTO: 0.1 X10(3)/MCL
BASOPHILS NFR BLD AUTO: 0.9 %
BILIRUB SERPL-MCNC: 0.8 MG/DL
BUN SERPL-MCNC: 10.6 MG/DL (ref 8.4–25.7)
CALCIUM SERPL-MCNC: 9.6 MG/DL (ref 8.4–10.2)
CHLORIDE SERPL-SCNC: 100 MMOL/L (ref 98–107)
CHOLEST SERPL-MCNC: 153 MG/DL
CHOLEST/HDLC SERPL: 3 {RATIO} (ref 0–5)
CO2 SERPL-SCNC: 22 MMOL/L (ref 22–29)
CREAT SERPL-MCNC: 1.32 MG/DL (ref 0.73–1.18)
CREAT/UREA NIT SERPL: 8
EOSINOPHIL # BLD AUTO: 0.15 X10(3)/MCL (ref 0–0.9)
EOSINOPHIL NFR BLD AUTO: 1.4 %
ERYTHROCYTE [DISTWIDTH] IN BLOOD BY AUTOMATED COUNT: 12.2 % (ref 11.5–17)
EST. AVERAGE GLUCOSE BLD GHB EST-MCNC: 85.3 MG/DL
GFR SERPLBLD CREATININE-BSD FMLA CKD-EPI: >60 ML/MIN/1.73/M2
GLOBULIN SER-MCNC: 4 GM/DL (ref 2.4–3.5)
GLUCOSE SERPL-MCNC: 90 MG/DL (ref 74–100)
HBA1C MFR BLD: 4.6 %
HCT VFR BLD AUTO: 40.4 % (ref 42–52)
HDLC SERPL-MCNC: 58 MG/DL (ref 35–60)
HGB BLD-MCNC: 13.7 G/DL (ref 14–18)
IMM GRANULOCYTES # BLD AUTO: 0.04 X10(3)/MCL (ref 0–0.04)
IMM GRANULOCYTES NFR BLD AUTO: 0.4 %
LDLC SERPL CALC-MCNC: 77 MG/DL (ref 50–140)
LYMPHOCYTES # BLD AUTO: 1.64 X10(3)/MCL (ref 0.6–4.6)
LYMPHOCYTES NFR BLD AUTO: 15.1 %
MCH RBC QN AUTO: 30.3 PG (ref 27–31)
MCHC RBC AUTO-ENTMCNC: 33.9 G/DL (ref 33–36)
MCV RBC AUTO: 89.4 FL (ref 80–94)
MONOCYTES # BLD AUTO: 1.61 X10(3)/MCL (ref 0.1–1.3)
MONOCYTES NFR BLD AUTO: 14.9 %
NEUTROPHILS # BLD AUTO: 7.3 X10(3)/MCL (ref 2.1–9.2)
NEUTROPHILS NFR BLD AUTO: 67.3 %
NRBC BLD AUTO-RTO: 0 %
PLATELET # BLD AUTO: 319 X10(3)/MCL (ref 130–400)
PMV BLD AUTO: 9.2 FL (ref 7.4–10.4)
POTASSIUM SERPL-SCNC: 3.7 MMOL/L (ref 3.5–5.1)
PROT SERPL-MCNC: 7.9 GM/DL (ref 6.4–8.3)
PSA SERPL-MCNC: 0.92 NG/ML
RBC # BLD AUTO: 4.52 X10(6)/MCL (ref 4.7–6.1)
SODIUM SERPL-SCNC: 135 MMOL/L (ref 136–145)
T PALLIDUM AB SER QL: NONREACTIVE
TRIGL SERPL-MCNC: 90 MG/DL (ref 34–140)
TSH SERPL-ACNC: 4.68 UIU/ML (ref 0.35–4.94)
VLDLC SERPL CALC-MCNC: 18 MG/DL
WBC # BLD AUTO: 10.84 X10(3)/MCL (ref 4.5–11.5)

## 2024-07-05 PROCEDURE — 86780 TREPONEMA PALLIDUM: CPT

## 2024-07-05 PROCEDURE — 84443 ASSAY THYROID STIM HORMONE: CPT

## 2024-07-05 PROCEDURE — 80053 COMPREHEN METABOLIC PANEL: CPT

## 2024-07-05 PROCEDURE — 85025 COMPLETE CBC W/AUTO DIFF WBC: CPT

## 2024-07-05 PROCEDURE — 80061 LIPID PANEL: CPT

## 2024-07-05 PROCEDURE — 86803 HEPATITIS C AB TEST: CPT

## 2024-07-05 PROCEDURE — 87389 HIV-1 AG W/HIV-1&-2 AB AG IA: CPT

## 2024-07-05 PROCEDURE — 36415 COLL VENOUS BLD VENIPUNCTURE: CPT

## 2024-07-05 PROCEDURE — 83036 HEMOGLOBIN GLYCOSYLATED A1C: CPT

## 2024-07-05 PROCEDURE — 84153 ASSAY OF PSA TOTAL: CPT

## 2024-07-06 LAB
HCV AB SERPL QL IA: NONREACTIVE
HIV 1+2 AB+HIV1 P24 AG SERPL QL IA: NONREACTIVE

## 2024-07-08 ENCOUNTER — OFFICE VISIT (OUTPATIENT)
Dept: FAMILY MEDICINE | Facility: CLINIC | Age: 59
End: 2024-07-08
Payer: COMMERCIAL

## 2024-07-08 VITALS
BODY MASS INDEX: 35.6 KG/M2 | OXYGEN SATURATION: 97 % | HEART RATE: 100 BPM | DIASTOLIC BLOOD PRESSURE: 78 MMHG | WEIGHT: 221.5 LBS | RESPIRATION RATE: 20 BRPM | SYSTOLIC BLOOD PRESSURE: 156 MMHG | HEIGHT: 66 IN | TEMPERATURE: 99 F

## 2024-07-08 DIAGNOSIS — Z12.5 SCREENING PSA (PROSTATE SPECIFIC ANTIGEN): ICD-10-CM

## 2024-07-08 DIAGNOSIS — Z28.21 PNEUMOCOCCAL VACCINATION DECLINED: ICD-10-CM

## 2024-07-08 DIAGNOSIS — Z00.00 ENCOUNTER FOR WELLNESS EXAMINATION: Primary | ICD-10-CM

## 2024-07-08 DIAGNOSIS — Z13.6 ENCOUNTER FOR LIPID SCREENING FOR CARDIOVASCULAR DISEASE: ICD-10-CM

## 2024-07-08 DIAGNOSIS — N28.89 RENAL MASS, RIGHT: ICD-10-CM

## 2024-07-08 DIAGNOSIS — I10 PRIMARY HYPERTENSION: ICD-10-CM

## 2024-07-08 DIAGNOSIS — R80.9 PROTEINURIA, UNSPECIFIED TYPE: ICD-10-CM

## 2024-07-08 DIAGNOSIS — Z13.1 SCREENING FOR DIABETES MELLITUS: ICD-10-CM

## 2024-07-08 DIAGNOSIS — E87.1 HYPONATREMIA: ICD-10-CM

## 2024-07-08 DIAGNOSIS — Z13.220 ENCOUNTER FOR LIPID SCREENING FOR CARDIOVASCULAR DISEASE: ICD-10-CM

## 2024-07-08 DIAGNOSIS — F10.20 ALCOHOLISM: ICD-10-CM

## 2024-07-08 DIAGNOSIS — R79.89 ELEVATED SERUM CREATININE: ICD-10-CM

## 2024-07-08 DIAGNOSIS — Z12.11 COLON CANCER SCREENING: ICD-10-CM

## 2024-07-08 LAB
BACTERIA #/AREA URNS AUTO: ABNORMAL /HPF
BILIRUB UR QL STRIP.AUTO: NEGATIVE
CLARITY UR: CLEAR
COLOR UR AUTO: COLORLESS
CREAT UR-MCNC: 36.1 MG/DL (ref 63–166)
GLUCOSE UR QL STRIP: NORMAL
HGB UR QL STRIP: ABNORMAL
KETONES UR QL STRIP: NEGATIVE
LEUKOCYTE ESTERASE UR QL STRIP: NEGATIVE
MICROALBUMIN UR-MCNC: 26.5 UG/ML
MICROALBUMIN/CREAT RATIO PNL UR: 73.4 MG/GM CR (ref 0–30)
NITRITE UR QL STRIP: NEGATIVE
PH UR STRIP: 6 [PH]
PROT UR QL STRIP: NEGATIVE
RBC #/AREA URNS AUTO: ABNORMAL /HPF
SP GR UR STRIP.AUTO: 1 (ref 1–1.03)
SQUAMOUS #/AREA URNS LPF: ABNORMAL /HPF
UROBILINOGEN UR STRIP-ACNC: NORMAL
WBC #/AREA URNS AUTO: ABNORMAL /HPF

## 2024-07-08 PROCEDURE — 81001 URINALYSIS AUTO W/SCOPE: CPT | Performed by: FAMILY MEDICINE

## 2024-07-08 PROCEDURE — 3077F SYST BP >= 140 MM HG: CPT | Mod: CPTII,,, | Performed by: FAMILY MEDICINE

## 2024-07-08 PROCEDURE — 82570 ASSAY OF URINE CREATININE: CPT | Performed by: FAMILY MEDICINE

## 2024-07-08 PROCEDURE — 3078F DIAST BP <80 MM HG: CPT | Mod: CPTII,,, | Performed by: FAMILY MEDICINE

## 2024-07-08 PROCEDURE — 3008F BODY MASS INDEX DOCD: CPT | Mod: CPTII,,, | Performed by: FAMILY MEDICINE

## 2024-07-08 PROCEDURE — 1160F RVW MEDS BY RX/DR IN RCRD: CPT | Mod: CPTII,,, | Performed by: FAMILY MEDICINE

## 2024-07-08 PROCEDURE — 82043 UR ALBUMIN QUANTITATIVE: CPT | Performed by: FAMILY MEDICINE

## 2024-07-08 PROCEDURE — 99396 PREV VISIT EST AGE 40-64: CPT | Mod: ,,, | Performed by: FAMILY MEDICINE

## 2024-07-08 PROCEDURE — 1159F MED LIST DOCD IN RCRD: CPT | Mod: CPTII,,, | Performed by: FAMILY MEDICINE

## 2024-07-08 PROCEDURE — 4010F ACE/ARB THERAPY RXD/TAKEN: CPT | Mod: CPTII,,, | Performed by: FAMILY MEDICINE

## 2024-07-08 PROCEDURE — 99214 OFFICE O/P EST MOD 30 MIN: CPT | Mod: 25,,, | Performed by: FAMILY MEDICINE

## 2024-07-08 PROCEDURE — 3044F HG A1C LEVEL LT 7.0%: CPT | Mod: CPTII,,, | Performed by: FAMILY MEDICINE

## 2024-07-08 RX ORDER — HYDRALAZINE HYDROCHLORIDE 50 MG/1
100 TABLET, FILM COATED ORAL EVERY 12 HOURS
Qty: 120 TABLET | Refills: 0 | Status: SHIPPED | OUTPATIENT
Start: 2024-07-08 | End: 2025-07-08

## 2024-07-08 RX ORDER — LISINOPRIL 20 MG/1
20 TABLET ORAL DAILY
Qty: 30 TABLET | Refills: 1 | Status: SHIPPED | OUTPATIENT
Start: 2024-07-08 | End: 2025-07-08

## 2024-07-08 NOTE — PROGRESS NOTES
Patient ID: [id number]     Chief Complaint: Annual Exam (Wellness with lab results)        HPI:     Elan Lentz Jr. is a 58 y.o. male here today for an annual wellness. Reviewed and discussed lab results. Overall he feels well. No other complaints today. Dad had CABG in 70's.     As a separate em visit, patient's blood pressure remains elevated. He is asymptomatic. The addition of amlodipine caused bl le edema. He does not want a cardiology referral at this time. Urine with 2+ protein.     Discussed colon cancer screening options with patient. Patient verbalized understanding  Colonoscopy is the gold standard and is always recommended as first line for screening.   Cologuard is second line- has a 13% false positive rate and 4% false negative rate, meaning cancers can be missed up to 4% of the time and positives are not really positives 13% of the time  If you have any black or bloody stools or a family history of colon cancer or IBD, then cologuard is not for you and you should have a colonoscopy        -------------------------------------    Hypertension    Renal mass, right        Past Surgical History:   Procedure Laterality Date    APPENDECTOMY         Review of patient's allergies indicates:  No Known Allergies    Outpatient Medications Marked as Taking for the 7/8/24 encounter (Office Visit) with Alba Holm MD   Medication Sig Dispense Refill    acetaminophen (TYLENOL) 325 MG tablet Take 2 tablets (650 mg total) by mouth every 8 (eight) hours as needed for Pain or Temperature greater than (100.4). 30 tablet 0    [DISCONTINUED] amLODIPine (NORVASC) 10 MG tablet Take 10 mg by mouth once daily.      [DISCONTINUED] hydrALAZINE (APRESOLINE) 25 MG tablet Take 2 tablets (50 mg total) by mouth every 8 (eight) hours. Avoid sbp<110 180 tablet 0       Social History     Socioeconomic History    Marital status: Single   Tobacco Use    Smoking status: Never    Smokeless tobacco: Never   Substance and  Sexual Activity    Alcohol use: Yes    Drug use: Not Currently     Social Determinants of Health     Financial Resource Strain: Low Risk  (7/8/2024)    Overall Financial Resource Strain (CARDIA)     Difficulty of Paying Living Expenses: Not hard at all   Food Insecurity: No Food Insecurity (7/8/2024)    Hunger Vital Sign     Worried About Running Out of Food in the Last Year: Never true     Ran Out of Food in the Last Year: Never true   Transportation Needs: No Transportation Needs (7/8/2024)    TRANSPORTATION NEEDS     Transportation : No   Physical Activity: Inactive (7/8/2024)    Exercise Vital Sign     Days of Exercise per Week: 0 days     Minutes of Exercise per Session: 0 min   Stress: No Stress Concern Present (5/21/2024)    [place] citizen [employer] - Occupational Stress Questionnaire     Feeling of Stress : Not at all   Housing Stability: Low Risk  (7/8/2024)    Housing Stability Vital Sign     Unable to Pay for Housing in the Last Year: No     Homeless in the Last Year: No        Family History   Problem Relation Name Age of Onset    Parkinsonism Father      Heart disease Father          Patient Care Team:  Alba Holm MD as PCP - General (Family Medicine)     Subjective:     ROS    See HPI for details    Constitutional: Denies Change in appetite. Denies Chills. Denies Fever. Denies Night sweats.  Eye: Denies Blurred vision. Denies Discharge. Denies Eye pain.  ENT: Denies Decreased hearing. Denies Sore throat. Denies Swollen glands.  Respiratory: Denies Cough. Denies Shortness of breath. Denies Shortness of breath with exertion. Denies Wheezing.  Cardiovascular: DeniesChest pain at rest. Denies Chest pain with exertion. Denies Irregular heartbeat. Denies Palpitations. Denies Edema.  Gastrointestinal: Denies Abdominal pain. DeniesDiarrhea. Denies Nausea. Denies Vomiting. Denies Hematemesis or Hematochezia.  Genitourinary: Denies Dysuria. Denies Urinary frequency. Denies Urinary  "urgency. Denies Blood in urine.  Integumentary: Denies Rash. Denies Itching. Denies Dry skin.  Psychiatric: Denies Depression. Denies Anxiety. Denies Suicidal/Homicidal ideations.    All Other ROS: Negative except as stated in HPI.       Objective:     BP (!) 156/78 (BP Location: Left arm, Patient Position: Sitting, BP Method: Large (Manual))   Pulse 100   Temp 98.7 °F (37.1 °C) (Oral)   Resp 20   Ht 5' 6" (1.676 m)   Wt 100.5 kg (221 lb 8 oz)   SpO2 97%   BMI 35.75 kg/m²     Physical Exam    General: Alert and oriented, No acute distress.  Head: Normocephalic, Atraumatic.  Eye: Pupils are equal, round and reactive to light, Extraocular movements are intact, Sclera non-icteric.  Ears/Nose/Throat: Tm+ light reflexes bilaterally. Normal, Mucosa moist,Clear. Teeth intact. NO lesions of tongue, palate, mucosa  Respiratory: Clear to auscultation bilaterally; No wheezes, rales or rhonchi,Non-labored respirations, Symmetrical chest wall expansion.  Cardiovascular: Regular rate and rhythm, S1/S2 normal, No murmurs, rubs or gallops.  Gastrointestinal: Soft, Non-tender, Non-distended, Normal bowel sounds, No palpable organomegaly.  Integumentary: Warm, Dry, Intact, No suspicious lesions or rashes.  Extremities: No cyanosis, + bl le edema  Psychiatric: Normal interaction, Coherent speech, Euthymic mood, Appropriate affect       Assessment:       ICD-10-CM ICD-9-CM   1. Encounter for wellness examination  Z00.00 V70.0   2. Primary hypertension  I10 401.9   3. Alcoholism  F10.20 303.90   4. Hyponatremia  E87.1 276.1   5. Proteinuria, unspecified type  R80.9 791.0   6. Colon cancer screening  Z12.11 V76.51   7. Renal mass, right  N28.89 593.9   8. Elevated serum creatinine  R79.89 790.99   9. Pneumococcal vaccination declined  Z28.21 V64.06   10. Screening PSA (prostate specific antigen)  Z12.5 V76.44   11. Screening for diabetes mellitus  Z13.1 V77.1   12. Encounter for lipid screening for cardiovascular disease  Z13.220 " V77.91    Z13.6 V81.2        Plan:         Health Maintenance Topics with due status: Not Due       Topic Last Completion Date    Hemoglobin A1c (Diabetic Prevention Screening) 07/05/2024    Lipid Panel 07/05/2024    Influenza Vaccine Not Due        AAA Screening - screening for abdominal aneurysms if you have ever smoked a cigarette    Prostate Cancer Screening - starting at age 45 for  men and 50 if . Start sooner if father/brother with prostate cancer    Colon Cancer Screening -  recommended starting at age 45 unless a first degree relative has/had colon cancer then may be needed sooner    Eye Exam - Recommend annually.     Dental Exam - Recommend biannual exams.     Vaccinations -   There is no immunization history on file for this patient.   Patient was counseled on risks/benefits of receiving immunization. All questions were answered    Problem List Items Addressed This Visit          Psychiatric    Alcoholism    Overview     He was drinking 5 beers per day resulting in beer potomania. Sodium as low as 117. It is up to 135.     Cessation strongly encouraged            Cardiac/Vascular    Primary hypertension    Overview     Patient unable to take hydralzine tid. Amlodipine is causing his legs to swell    Stop amlodipine  Increase hydralazine to 100 mg bid  Add lisinopril 20 mg daily  Rtc 2 wks with bmp         Relevant Medications    hydrALAZINE (APRESOLINE) 50 MG tablet    lisinopriL (PRINIVIL,ZESTRIL) 20 MG tablet    Other Relevant Orders    Basic Metabolic Panel    CBC Auto Differential    Comprehensive Metabolic Panel    Lipid Panel    TSH    Hemoglobin A1C    Urinalysis    PSA, Screening    Microalbumin/Creatinine Ratio, Urine    Urinalysis, Reflex to Urine Culture       Renal/    Renal mass, right    Overview     Patient has f/u with nephrology. Will be having a nephrectomy         Elevated serum creatinine    Overview     With proteinuria    Likely 2/2 uncontrolled htn  Urine  microalbumin and UA today  Will monitor            Endocrine    Hyponatremia    Overview     Likely beer potomania 2/2 alcoholism. Worked up by nephrology while inpatient. Sodium improving significantly with decrease in alcohol intake.     Continue to abstain from alcohol  Will monitor          Other Visit Diagnoses       Encounter for wellness examination    -  Primary    Relevant Orders    CBC Auto Differential    Comprehensive Metabolic Panel    Lipid Panel    TSH    Hemoglobin A1C    Urinalysis    PSA, Screening    Proteinuria, unspecified type        Relevant Orders    Basic Metabolic Panel    Microalbumin/Creatinine Ratio, Urine    Urinalysis, Reflex to Urine Culture    Colon cancer screening        Relevant Orders    Cologuard Screening (Multitarget Stool DNA)    Pneumococcal vaccination declined        Screening PSA (prostate specific antigen)        Relevant Orders    PSA, Screening    Screening for diabetes mellitus        Relevant Orders    Hemoglobin A1C    Encounter for lipid screening for cardiovascular disease        Relevant Orders    Lipid Panel            Exercise for a total of 150 min per week and eat a healthy diet  Perform monthly self testicular exams to screen for testicular cancer  Stay up to date with all cancer screening discussed in visit  Immunizations due were discussed during visit  All health maintenance was reviewed with patient. Patient verbalized understanding. All questions were answered.     Medication List with Changes/Refills   New Medications    HYDRALAZINE (APRESOLINE) 50 MG TABLET    Take 2 tablets (100 mg total) by mouth every 12 (twelve) hours.       Start Date: 7/8/2024  End Date: 7/8/2025    LISINOPRIL (PRINIVIL,ZESTRIL) 20 MG TABLET    Take 1 tablet (20 mg total) by mouth once daily.       Start Date: 7/8/2024  End Date: 7/8/2025   Current Medications    ACETAMINOPHEN (TYLENOL) 325 MG TABLET    Take 2 tablets (650 mg total) by mouth every 8 (eight) hours as needed for  Pain or Temperature greater than (100.4).       Start Date: 5/21/2024 End Date: --   Discontinued Medications    AMLODIPINE (NORVASC) 10 MG TABLET    Take 10 mg by mouth once daily.       Start Date: 4/24/2024 End Date: 7/8/2024    HYDRALAZINE (APRESOLINE) 25 MG TABLET    Take 2 tablets (50 mg total) by mouth every 8 (eight) hours. Avoid sbp<110       Start Date: 7/3/2024  End Date: 7/8/2024          The patient's Health Maintenance was reviewed and the following appears to be due at this time:   Health Maintenance Due   Topic Date Due    Pneumococcal Vaccines (Age 0-64) (1 of 2 - PCV) Never done    Colorectal Cancer Screening  Never done    COVID-19 Vaccine (1 - 2023-24 season) Never done         Follow up for 2 wks htn with labs and one year wellness with labs. In addition to their scheduled follow up, the patient has also been instructed to follow up on as needed basis.

## 2024-07-08 NOTE — PATIENT INSTRUCTIONS
Start hydralazine 100 mg twice a day.   Start lisinopril 20 mg daily  Stop amlodipine  Recommend a low salt diet, weight loss and exercise  Avoid drinking too much caffeine  Take blood pressure medications 2-3 hours BEFORE every appointment so we can get an accurate reading in the office  Check your blood pressure twice a day and write it down. Bring blood pressure log and blood pressure cuff to next visit  Call me with pressure more than 140/90 or less than 100/60

## 2024-07-24 ENCOUNTER — LAB VISIT (OUTPATIENT)
Dept: LAB | Facility: HOSPITAL | Age: 59
End: 2024-07-24
Attending: FAMILY MEDICINE
Payer: COMMERCIAL

## 2024-07-24 DIAGNOSIS — I10 PRIMARY HYPERTENSION: ICD-10-CM

## 2024-07-24 DIAGNOSIS — R80.9 PROTEINURIA, UNSPECIFIED TYPE: ICD-10-CM

## 2024-07-24 LAB
ANION GAP SERPL CALC-SCNC: 4 MEQ/L
BUN SERPL-MCNC: 13 MG/DL (ref 8.4–25.7)
CALCIUM SERPL-MCNC: 9.6 MG/DL (ref 8.4–10.2)
CHLORIDE SERPL-SCNC: 97 MMOL/L (ref 98–107)
CO2 SERPL-SCNC: 25 MMOL/L (ref 22–29)
CREAT SERPL-MCNC: 1.39 MG/DL (ref 0.73–1.18)
CREAT/UREA NIT SERPL: 9
GFR SERPLBLD CREATININE-BSD FMLA CKD-EPI: 58 ML/MIN/1.73/M2
GLUCOSE SERPL-MCNC: 97 MG/DL (ref 74–100)
POTASSIUM SERPL-SCNC: 4.8 MMOL/L (ref 3.5–5.1)
SODIUM SERPL-SCNC: 126 MMOL/L (ref 136–145)

## 2024-07-24 PROCEDURE — 36415 COLL VENOUS BLD VENIPUNCTURE: CPT

## 2024-07-24 PROCEDURE — 80048 BASIC METABOLIC PNL TOTAL CA: CPT

## 2024-07-25 ENCOUNTER — OFFICE VISIT (OUTPATIENT)
Dept: FAMILY MEDICINE | Facility: CLINIC | Age: 59
End: 2024-07-25
Payer: COMMERCIAL

## 2024-07-25 VITALS
WEIGHT: 223.38 LBS | SYSTOLIC BLOOD PRESSURE: 162 MMHG | RESPIRATION RATE: 20 BRPM | OXYGEN SATURATION: 98 % | HEIGHT: 66 IN | DIASTOLIC BLOOD PRESSURE: 82 MMHG | BODY MASS INDEX: 35.9 KG/M2 | HEART RATE: 90 BPM | TEMPERATURE: 98 F

## 2024-07-25 DIAGNOSIS — I10 PRIMARY HYPERTENSION: Primary | ICD-10-CM

## 2024-07-25 DIAGNOSIS — N18.31 CKD STAGE 3A, GFR 45-59 ML/MIN: ICD-10-CM

## 2024-07-25 DIAGNOSIS — E87.1 HYPONATREMIA: ICD-10-CM

## 2024-07-25 DIAGNOSIS — F10.20 ALCOHOLISM: ICD-10-CM

## 2024-07-25 RX ORDER — LABETALOL 100 MG/1
100 TABLET, FILM COATED ORAL 2 TIMES DAILY
Qty: 60 TABLET | Refills: 1 | Status: SHIPPED | OUTPATIENT
Start: 2024-07-25 | End: 2025-07-25

## 2024-07-25 NOTE — PROGRESS NOTES
Elan Lentz Radha  07/25/2024  [id number]    Alba Holm MD  Patient Care Team:  Alba Holm MD as PCP - General (Family Medicine)      Chief Complaint:  Chief Complaint   Patient presents with    Follow-up     2 week f/u for HTN       History of Present Illness:    58 y.o. male who presents today for htn f/u with labs. Labs reviewed by me and were discussed with patient. All questions regarding lab results were answered.     Labs reveal ongoing hyponatremia, sodium 125 and ckd stage 3a. He continues to drink more than a 6 pack of beer per night.    Review of Systems  General: denies f/c, weight loss, night sweats, decreased appetite  Eye: denies blurred vision, changes in vision  Respiratory: denies sob, wheezing, cough  Cardiovascular: denies chest pain, palpitations, edema  Gastrointestinal: denies abdominal pain, n/v, constipation, diarrhea  Integumentary: denies rashes, pruritis    Past Medical History  Past Medical History:   Diagnosis Date    Hypertension     Renal mass, right        Medications  Medication List with Changes/Refills   New Medications    LABETALOL (NORMODYNE) 100 MG TABLET    Take 1 tablet (100 mg total) by mouth 2 (two) times daily.   Current Medications    ACETAMINOPHEN (TYLENOL) 325 MG TABLET    Take 2 tablets (650 mg total) by mouth every 8 (eight) hours as needed for Pain or Temperature greater than (100.4).    HYDRALAZINE (APRESOLINE) 50 MG TABLET    Take 2 tablets (100 mg total) by mouth every 12 (twelve) hours.    LISINOPRIL (PRINIVIL,ZESTRIL) 20 MG TABLET    Take 1 tablet (20 mg total) by mouth once daily.       Past Surgical History:   Procedure Laterality Date    APPENDECTOMY         SUBJECTIVE:  Health Maintenance  Health Maintenance Topics with due status: Not Due       Topic Last Completion Date    Hemoglobin A1c (Diabetic Prevention Screening) 07/05/2024    Lipid Panel 07/05/2024    Annual UACr 07/08/2024    Influenza Vaccine Not Due     Health  "Maintenance Due   Topic Date Due    Pneumococcal Vaccines (Age 0-64) (1 of 2 - PCV) Never done    Colorectal Cancer Screening  Never done    COVID-19 Vaccine (1 - 2023-24 season) Never done       Exam:  Vitals:    07/25/24 1331   BP: (!) 180/78   BP Location: Left arm   Patient Position: Sitting   BP Method: Large (Manual)   Pulse: 90   Resp: 20   Temp: 97.7 °F (36.5 °C)   TempSrc: Oral   SpO2: 98%   Weight: 101.3 kg (223 lb 6.4 oz)   Height: 5' 6" (1.676 m)     Weight: 101.3 kg (223 lb 6.4 oz)   Body mass index is 36.06 kg/m².      Physical Exam  Constitutional: NAD, alert, pleasant  Respiratory: CTAB, no wheezes, rales or rhonchi. No accessory muscle use  Eyes: EOMI  Cardiovascular: RRR, No m/r/g.   Integumentary: warm, dry, intact  Psych: AA&Ox3      ICD-10-CM ICD-9-CM   1. Primary hypertension  I10 401.9   2. CKD stage 3a, GFR 45-59 ml/min  N18.31 585.3   3. Hyponatremia  E87.1 276.1   4. Alcoholism  F10.20 303.90       1. Primary hypertension  Overview:  Patient unable to take hydralzine tid. Amlodipine is causing his legs to swell    Bp not controlled on lisinopril 20 and hydralazine 100 mg bid. Creatinine stable.     He is hyponatremic so I will avoid a diuretic for now.     Will try labetalol 100 mg bid and rtc 1 wks        Orders:  -     labetaloL (NORMODYNE) 100 MG tablet; Take 1 tablet (100 mg total) by mouth 2 (two) times daily.  Dispense: 60 tablet; Refill: 1    2. CKD stage 3a, GFR 45-59 ml/min  Overview:  Will continue to monitor renal indices  Avoid NSAIDS (aleve, motrin, naproxen, mobic, ibuprofen). It is ok to take in small doses on occasion, but check with your doctor first  Increase water intake to 80 oz of water daily  Adhere to a low protein diet  Alcohol cessation encouraged      3. Hyponatremia  Overview:  Likely beer potomania 2/2 alcoholism. Worked up by nephrology while inpatient. Sodium improving significantly with decrease in alcohol intake.     Continue to abstain from alcohol  Will " monitor      4. Alcoholism  Overview:  He is drinking more than a 6 pack of  beer per day resulting in beer potomania. Sodium as low as 117. It is up to 135.     Cessation strongly encouraged           Follow up: No follow-ups on file.      Care Plan/Goals: Reviewed   Goals    None

## 2024-07-31 ENCOUNTER — OFFICE VISIT (OUTPATIENT)
Dept: FAMILY MEDICINE | Facility: CLINIC | Age: 59
End: 2024-07-31
Payer: COMMERCIAL

## 2024-07-31 VITALS
OXYGEN SATURATION: 97 % | BODY MASS INDEX: 35.84 KG/M2 | SYSTOLIC BLOOD PRESSURE: 137 MMHG | DIASTOLIC BLOOD PRESSURE: 76 MMHG | TEMPERATURE: 97 F | RESPIRATION RATE: 18 BRPM | WEIGHT: 223 LBS | HEART RATE: 86 BPM | HEIGHT: 66 IN

## 2024-07-31 DIAGNOSIS — I10 PRIMARY HYPERTENSION: Primary | ICD-10-CM

## 2024-07-31 RX ORDER — HYDRALAZINE HYDROCHLORIDE 50 MG/1
100 TABLET, FILM COATED ORAL EVERY 12 HOURS
Qty: 360 TABLET | Refills: 3 | Status: SHIPPED | OUTPATIENT
Start: 2024-07-31 | End: 2025-07-31

## 2024-07-31 RX ORDER — LABETALOL 100 MG/1
100 TABLET, FILM COATED ORAL 2 TIMES DAILY
Qty: 180 TABLET | Refills: 3 | Status: SHIPPED | OUTPATIENT
Start: 2024-07-31 | End: 2025-07-31

## 2024-07-31 RX ORDER — LISINOPRIL 20 MG/1
20 TABLET ORAL DAILY
Qty: 90 TABLET | Refills: 3 | Status: SHIPPED | OUTPATIENT
Start: 2024-07-31 | End: 2025-07-31

## 2024-07-31 NOTE — PROGRESS NOTES
[facility]  07/31/2024  [id number]    Alba Holm MD  Patient Care Team:  Alba Holm MD as PCP - General (Family Medicine)      Chief Complaint:  Chief Complaint   Patient presents with    Hypertension     Has not been checking at home    Medication Refill     Needs refill on lisinopril       History of Present Illness:    58 y.o. male who presents today for htn. BP is finally at goal. He also stopped drinking beer after work during the weekdays. He feels great, no side effects. He is in need of refills on all meds.     Review of Systems  General: denies f/c, weight loss, night sweats, decreased appetite  Eye: denies blurred vision, changes in vision  Respiratory: denies sob, wheezing, cough  Cardiovascular: denies chest pain, palpitations, edema  Gastrointestinal: denies abdominal pain, n/v, constipation, diarrhea  Integumentary: denies rashes, pruritis    Past Medical History  Past Medical History:   Diagnosis Date    Hypertension     Renal mass, right        Medications  Medication List with Changes/Refills   Current Medications    ACETAMINOPHEN (TYLENOL) 325 MG TABLET    Take 2 tablets (650 mg total) by mouth every 8 (eight) hours as needed for Pain or Temperature greater than (100.4).   Changed and/or Refilled Medications    Modified Medication Previous Medication    HYDRALAZINE (APRESOLINE) 50 MG TABLET hydrALAZINE (APRESOLINE) 50 MG tablet       Take 2 tablets (100 mg total) by mouth every 12 (twelve) hours.    Take 2 tablets (100 mg total) by mouth every 12 (twelve) hours.    LABETALOL (NORMODYNE) 100 MG TABLET labetaloL (NORMODYNE) 100 MG tablet       Take 1 tablet (100 mg total) by mouth 2 (two) times daily.    Take 1 tablet (100 mg total) by mouth 2 (two) times daily.    LISINOPRIL (PRINIVIL,ZESTRIL) 20 MG TABLET lisinopriL (PRINIVIL,ZESTRIL) 20 MG tablet       Take 1 tablet (20 mg total) by mouth once daily.    Take 1 tablet (20 mg total) by mouth once daily.       Past  "Surgical History:   Procedure Laterality Date    APPENDECTOMY         SUBJECTIVE:  Health Maintenance  Health Maintenance Topics with due status: Not Due       Topic Last Completion Date    Hemoglobin A1c (Diabetic Prevention Screening) 07/05/2024    Lipid Panel 07/05/2024    Annual UACr 07/08/2024    Influenza Vaccine Not Due     Health Maintenance Due   Topic Date Due    Pneumococcal Vaccines (Age 0-64) (1 of 2 - PCV) Never done    Colorectal Cancer Screening  Never done    COVID-19 Vaccine (1 - 2023-24 season) Never done       Exam:  Vitals:    07/31/24 1413   BP: 137/76   Pulse: 86   Resp: 18   Temp: 96.9 °F (36.1 °C)   SpO2: 97%   Weight: 101.2 kg (223 lb)   Height: 5' 6" (1.676 m)     Weight: 101.2 kg (223 lb)   Body mass index is 35.99 kg/m².      Physical Exam  Constitutional: NAD, alert, pleasant  Respiratory: CTAB, no wheezes, rales or rhonchi. No accessory muscle use  Eyes: EOMI  Cardiovascular: RRR, No m/r/g. No JVD. No LE edema  Gastrointestinal: BS+, nontender, nondistended  Integumentary: warm, dry, intact  Psych: AA&Ox3      ICD-10-CM ICD-9-CM   1. Primary hypertension  I10 401.9       1. Primary hypertension  Overview:  Patient unable to take hydralzine tid. Amlodipine is causing his legs to swell    Bp controlled on lisinopril 20, labetalol 100 mg bid and hydralazine 100 mg bid. Creatinine stable.     Continue current Rx meds  Recommend a low salt diet, weight loss and exercise  Avoid drinking too much caffeine  Take blood pressure medications 2-3 hours BEFORE every appointment so we can get an accurate reading in the office  Check your blood pressure twice a day and write it down. Bring blood pressure log and blood pressure cuff to next visit  Call me with pressure more than 140/90 or less than 100/60  Rtc 3 mts      Orders:  -     lisinopriL (PRINIVIL,ZESTRIL) 20 MG tablet; Take 1 tablet (20 mg total) by mouth once daily.  Dispense: 90 tablet; Refill: 3  -     labetaloL (NORMODYNE) 100 MG tablet; " Take 1 tablet (100 mg total) by mouth 2 (two) times daily.  Dispense: 180 tablet; Refill: 3  -     hydrALAZINE (APRESOLINE) 50 MG tablet; Take 2 tablets (100 mg total) by mouth every 12 (twelve) hours.  Dispense: 360 tablet; Refill: 3  -     Comprehensive Metabolic Panel; Future; Expected date: 10/31/2024         Follow up: Follow up for 3 mts htn, ckd with labs.      Care Plan/Goals: Reviewed   Goals    None

## 2024-10-30 ENCOUNTER — PATIENT OUTREACH (OUTPATIENT)
Facility: CLINIC | Age: 59
End: 2024-10-30
Payer: COMMERCIAL

## 2025-04-30 ENCOUNTER — PATIENT OUTREACH (OUTPATIENT)
Facility: CLINIC | Age: 60
End: 2025-04-30
Payer: COMMERCIAL

## 2025-05-08 ENCOUNTER — PATIENT OUTREACH (OUTPATIENT)
Facility: CLINIC | Age: 60
End: 2025-05-08
Payer: COMMERCIAL

## 2025-05-26 ENCOUNTER — OFFICE VISIT (OUTPATIENT)
Dept: FAMILY MEDICINE | Facility: CLINIC | Age: 60
End: 2025-05-26
Payer: COMMERCIAL

## 2025-05-26 ENCOUNTER — TELEPHONE (OUTPATIENT)
Dept: FAMILY MEDICINE | Facility: CLINIC | Age: 60
End: 2025-05-26

## 2025-05-26 VITALS
BODY MASS INDEX: 36.19 KG/M2 | HEIGHT: 66 IN | SYSTOLIC BLOOD PRESSURE: 138 MMHG | TEMPERATURE: 98 F | OXYGEN SATURATION: 96 % | RESPIRATION RATE: 12 BRPM | HEART RATE: 78 BPM | WEIGHT: 225.19 LBS | DIASTOLIC BLOOD PRESSURE: 90 MMHG

## 2025-05-26 DIAGNOSIS — I10 PRIMARY HYPERTENSION: Primary | ICD-10-CM

## 2025-05-26 PROCEDURE — 3075F SYST BP GE 130 - 139MM HG: CPT | Mod: CPTII,,, | Performed by: FAMILY MEDICINE

## 2025-05-26 PROCEDURE — 99213 OFFICE O/P EST LOW 20 MIN: CPT | Mod: ,,, | Performed by: FAMILY MEDICINE

## 2025-05-26 PROCEDURE — 1159F MED LIST DOCD IN RCRD: CPT | Mod: CPTII,,, | Performed by: FAMILY MEDICINE

## 2025-05-26 PROCEDURE — G2211 COMPLEX E/M VISIT ADD ON: HCPCS | Mod: ,,, | Performed by: FAMILY MEDICINE

## 2025-05-26 PROCEDURE — 3008F BODY MASS INDEX DOCD: CPT | Mod: CPTII,,, | Performed by: FAMILY MEDICINE

## 2025-05-26 PROCEDURE — 3080F DIAST BP >= 90 MM HG: CPT | Mod: CPTII,,, | Performed by: FAMILY MEDICINE

## 2025-05-26 PROCEDURE — 4010F ACE/ARB THERAPY RXD/TAKEN: CPT | Mod: CPTII,,, | Performed by: FAMILY MEDICINE

## 2025-05-26 NOTE — ASSESSMENT & PLAN NOTE
Pressure is elevated today   Need labs to determine electrolytes and creatinine on current regimen   Return one-month  Consider switching labetalol to carvedilol   Consider switching lisinopril 20 to lisinopril 20 b.i.d.

## 2025-05-26 NOTE — TELEPHONE ENCOUNTER
Copied from CRM #[id number]. Topic: Appointments - Amb Referral  >> May 23, 2025  3:03 PM Ava wrote:  .Who Called: [facility] lab    What order is the patient requestin/26   When does the expect the orders to be performed? [facility]        Preferred Method of Contact: Phone Call  Patient's Preferred Phone Number on File: [phone]   Best Call Back Number, if different:Pt is at lab with no orders -   Additional Information: orders need to live for  [facility] appt on   for appt

## 2025-05-26 NOTE — TELEPHONE ENCOUNTER
Patient notified and voiced understanding. He will still come today for check b/p and discuss htn meds. Maria Elena

## 2025-05-26 NOTE — PROGRESS NOTES
Family Medicine    Patient ID: [id number]     Chief Complaint: Hypertension (Follow-up htn, patient c/o fatigue due to rx's)      HPI:     Elan Lentz Jr. is a 59 y.o. male here today for follow up on hypertension.        QUALITY METRICS:       Statin Therapy    A1c Control    BP Control    Breast Ca Scrn    Colon Ca Scrn    Cervical Ca Scrn    DM Eye Exam Scrn    Tobacco Q rate        MEDICAL HISTORY:       Past Medical History:   Diagnosis Date    Hypertension     Renal mass, right         Past Surgical History:   Procedure Laterality Date    APPENDECTOMY      NEPHRECTOMY Right 08/2024        Social History     Tobacco Use    Smoking status: Never    Smokeless tobacco: Never   Substance and Sexual Activity    Alcohol use: Yes    Drug use: Not Currently    Sexual activity: Not on file        Current Outpatient Medications   Medication Instructions    acetaminophen (TYLENOL) 650 mg, Oral, Every 8 hours PRN    hydrALAZINE (APRESOLINE) 100 mg, Oral, Every 12 hours    labetaloL (NORMODYNE) 100 mg, Oral, 2 times daily    lisinopriL (PRINIVIL,ZESTRIL) 20 mg, Oral, Daily       Review of patient's allergies indicates:  No Known Allergies     Patient Care Team:  Alba Holm MD as PCP - General (Family Medicine)     Subjective:     Review of Systems   Constitutional:  Negative for activity change, appetite change, fever and unexpected weight change.   HENT:  Negative for congestion, dental problem, rhinorrhea and trouble swallowing.    Eyes:  Negative for visual disturbance.   Respiratory:  Negative for chest tightness and shortness of breath.    Cardiovascular:  Negative for chest pain, palpitations and leg swelling.   Gastrointestinal:  Negative for abdominal pain, blood in stool, constipation, diarrhea, nausea and vomiting.   Genitourinary:  Negative for difficulty urinating.   Musculoskeletal:  Negative for gait problem.   Skin:  Negative for rash and wound.   Neurological:  Negative for dizziness,  "syncope, speech difficulty, weakness and light-headedness.   Psychiatric/Behavioral:  Negative for confusion and suicidal ideas.        12 point review of systems conducted, negative except as stated in the history of present illness. See HPI for details.    Objective:     Visit Vitals  BP (!) 138/90 (BP Location: Right arm, Patient Position: Sitting)   Pulse 78   Temp 98 °F (36.7 °C) (Oral)   Resp 12   Ht 5' 6" (1.676 m)   Wt 102.2 kg (225 lb 3.2 oz)   SpO2 96%   BMI 36.35 kg/m²       Physical Exam  Vitals and nursing note reviewed.   Constitutional:       General: He is not in acute distress.     Appearance: Normal appearance. He is not ill-appearing, toxic-appearing or diaphoretic.   HENT:      Head: Normocephalic and atraumatic.      Right Ear: Tympanic membrane, ear canal and external ear normal. There is no impacted cerumen.      Left Ear: Tympanic membrane, ear canal and external ear normal. There is no impacted cerumen.      Nose: No congestion or rhinorrhea.      Mouth/Throat:      Pharynx: Oropharynx is clear. No oropharyngeal exudate or posterior oropharyngeal erythema.   Eyes:      General:         Right eye: No discharge.         Left eye: No discharge.      Extraocular Movements: Extraocular movements intact.      Conjunctiva/sclera: Conjunctivae normal.   Cardiovascular:      Rate and Rhythm: Normal rate and regular rhythm.      Heart sounds: No murmur heard.     No friction rub. No gallop.   Pulmonary:      Effort: Pulmonary effort is normal. No respiratory distress.      Breath sounds: Normal breath sounds.   Musculoskeletal:      Right lower leg: No edema.      Left lower leg: No edema.   Skin:     Capillary Refill: Capillary refill takes less than 2 seconds.   Neurological:      Mental Status: He is alert and oriented to person, place, and time.   Psychiatric:         Mood and Affect: Mood normal.         Behavior: Behavior normal.         Thought Content: Thought content normal.         Recent " labs:     Chemistry:  Lab Results   Component Value Date     (L) 08/03/2024    K 4.2 08/03/2024    BUN 21 08/03/2024    CREATININE 1.87 (H) 08/03/2024    EGFRNORACEVR 58 07/24/2024    CALCIUM 7.9 (L) 08/03/2024    ALKPHOS 69 07/05/2024    ALBUMIN 3.9 07/05/2024    AST 23 07/05/2024    ALT 17 07/05/2024    MG 2.50 05/20/2024    PHOS 4.7 05/20/2024    TSH 4.681 07/05/2024    PSA 0.92 07/05/2024        Lab Results   Component Value Date    HGBA1C 4.6 07/05/2024        Hematology:  Lab Results   Component Value Date    WBC 10.84 07/05/2024    HGB 13.7 (L) 07/05/2024    HCT 40.4 (L) 07/05/2024     07/05/2024       Lipid Panel:  Lab Results   Component Value Date    CHOL 153 07/05/2024    HDL 58 07/05/2024    LDL 77.00 07/05/2024    TRIG 90 07/05/2024    TOTALCHOLEST 3 07/05/2024        Urine:  Lab Results   Component Value Date    APPEARANCEUA Clear 07/08/2024    SGUA 1.003 (L) 07/08/2024    PROTEINUA Negative 07/08/2024    KETONESUA Negative 07/08/2024    LEUKOCYTESUR Negative 07/08/2024    RBCUA 0-5 07/08/2024    WBCUA None Seen 07/08/2024    BACTERIA None Seen 07/08/2024    SQEPUA None Seen 07/08/2024    CREATRANDUR 36.1 (L) 07/08/2024        Assessment:       ICD-10-CM ICD-9-CM   1. Primary hypertension  I10 401.9        Plan:     1. Primary hypertension  Overview:  Labetalol 100 mg b.i.d.   Hydralazine 100 mg b.i.d. (2 x 50 b.i.d.)  Lisinopril 20 mg daily    2024 nephrectomy  CKD 3A    Assessment & Plan:  Pressure is elevated today   Need labs to determine electrolytes and creatinine on current regimen   Return one-month  Consider switching labetalol to carvedilol   Consider switching lisinopril 20 to lisinopril 20 b.i.d.           No follow-ups on file. In addition to their scheduled follow up, the patient has also been instructed to follow up on as needed basis.     Future Appointments   Date Time Provider Department Center   7/10/2025  3:15 PM Alba Holm MD [facility]         Davey Nixon MD

## 2025-07-11 ENCOUNTER — LAB VISIT (OUTPATIENT)
Dept: LAB | Facility: HOSPITAL | Age: 60
End: 2025-07-11
Attending: FAMILY MEDICINE
Payer: COMMERCIAL

## 2025-07-11 DIAGNOSIS — Z00.00 ENCOUNTER FOR WELLNESS EXAMINATION: ICD-10-CM

## 2025-07-11 DIAGNOSIS — Z13.6 ENCOUNTER FOR LIPID SCREENING FOR CARDIOVASCULAR DISEASE: ICD-10-CM

## 2025-07-11 DIAGNOSIS — I10 PRIMARY HYPERTENSION: ICD-10-CM

## 2025-07-11 DIAGNOSIS — Z13.220 ENCOUNTER FOR LIPID SCREENING FOR CARDIOVASCULAR DISEASE: ICD-10-CM

## 2025-07-11 DIAGNOSIS — Z12.5 SCREENING PSA (PROSTATE SPECIFIC ANTIGEN): ICD-10-CM

## 2025-07-11 DIAGNOSIS — Z13.1 SCREENING FOR DIABETES MELLITUS: ICD-10-CM

## 2025-07-11 LAB
ALBUMIN SERPL-MCNC: 3.9 G/DL (ref 3.4–4.8)
ALBUMIN/GLOB SERPL: 1.3 RATIO (ref 1.1–2)
ALP SERPL-CCNC: 43 UNIT/L (ref 40–150)
ALT SERPL-CCNC: 24 UNIT/L (ref 0–55)
ANION GAP SERPL CALC-SCNC: 10 MEQ/L
AST SERPL-CCNC: 27 UNIT/L (ref 11–45)
BACTERIA #/AREA URNS AUTO: NORMAL /HPF
BASOPHILS # BLD AUTO: 0.1 X10(3)/MCL
BASOPHILS NFR BLD AUTO: 1.1 %
BILIRUB SERPL-MCNC: 0.8 MG/DL
BILIRUB UR QL STRIP.AUTO: NEGATIVE
BUN SERPL-MCNC: 17.1 MG/DL (ref 8.4–25.7)
CALCIUM SERPL-MCNC: 9.1 MG/DL (ref 8.8–10)
CHLORIDE SERPL-SCNC: 99 MMOL/L (ref 98–107)
CHOLEST SERPL-MCNC: 160 MG/DL
CHOLEST/HDLC SERPL: 3 {RATIO} (ref 0–5)
CLARITY UR: CLEAR
CO2 SERPL-SCNC: 23 MMOL/L (ref 23–31)
COLOR UR AUTO: NORMAL
CREAT SERPL-MCNC: 1.46 MG/DL (ref 0.72–1.25)
CREAT/UREA NIT SERPL: 12
EOSINOPHIL # BLD AUTO: 0.38 X10(3)/MCL (ref 0–0.9)
EOSINOPHIL NFR BLD AUTO: 4.1 %
ERYTHROCYTE [DISTWIDTH] IN BLOOD BY AUTOMATED COUNT: 11.5 % (ref 11.5–17)
EST. AVERAGE GLUCOSE BLD GHB EST-MCNC: 96.8 MG/DL
GFR SERPLBLD CREATININE-BSD FMLA CKD-EPI: 55 ML/MIN/1.73/M2
GLOBULIN SER-MCNC: 3 GM/DL (ref 2.4–3.5)
GLUCOSE SERPL-MCNC: 80 MG/DL (ref 82–115)
GLUCOSE UR QL STRIP: NORMAL
HBA1C MFR BLD: 5 %
HCT VFR BLD AUTO: 32 % (ref 42–52)
HDLC SERPL-MCNC: 47 MG/DL (ref 35–60)
HGB BLD-MCNC: 11 G/DL (ref 14–18)
HGB UR QL STRIP: NEGATIVE
IMM GRANULOCYTES # BLD AUTO: 0.02 X10(3)/MCL (ref 0–0.04)
IMM GRANULOCYTES NFR BLD AUTO: 0.2 %
KETONES UR QL STRIP: NEGATIVE
LDLC SERPL CALC-MCNC: 74 MG/DL (ref 50–140)
LEUKOCYTE ESTERASE UR QL STRIP: NEGATIVE
LYMPHOCYTES # BLD AUTO: 1.24 X10(3)/MCL (ref 0.6–4.6)
LYMPHOCYTES NFR BLD AUTO: 13.5 %
MCH RBC QN AUTO: 31.1 PG (ref 27–31)
MCHC RBC AUTO-ENTMCNC: 34.4 G/DL (ref 33–36)
MCV RBC AUTO: 90.4 FL (ref 80–94)
MONOCYTES # BLD AUTO: 1.11 X10(3)/MCL (ref 0.1–1.3)
MONOCYTES NFR BLD AUTO: 12.1 %
NEUTROPHILS # BLD AUTO: 6.33 X10(3)/MCL (ref 2.1–9.2)
NEUTROPHILS NFR BLD AUTO: 69 %
NITRITE UR QL STRIP: NEGATIVE
NRBC BLD AUTO-RTO: 0 %
PH UR STRIP: 7.5 [PH]
PLATELET # BLD AUTO: 234 X10(3)/MCL (ref 130–400)
PMV BLD AUTO: 9.6 FL (ref 7.4–10.4)
POTASSIUM SERPL-SCNC: 4.7 MMOL/L (ref 3.5–5.1)
PROT SERPL-MCNC: 6.9 GM/DL (ref 5.8–7.6)
PROT UR QL STRIP: NEGATIVE
PSA SERPL-MCNC: 0.72 NG/ML
RBC # BLD AUTO: 3.54 X10(6)/MCL (ref 4.7–6.1)
RBC #/AREA URNS AUTO: NORMAL /HPF
SODIUM SERPL-SCNC: 132 MMOL/L (ref 136–145)
SP GR UR STRIP.AUTO: 1.01 (ref 1–1.03)
SQUAMOUS #/AREA URNS LPF: NORMAL /HPF
TRIGL SERPL-MCNC: 193 MG/DL (ref 34–140)
TSH SERPL-ACNC: 4.23 UIU/ML (ref 0.35–4.94)
UROBILINOGEN UR STRIP-ACNC: NORMAL
VLDLC SERPL CALC-MCNC: 39 MG/DL
WBC # BLD AUTO: 9.18 X10(3)/MCL (ref 4.5–11.5)
WBC #/AREA URNS AUTO: NORMAL /HPF

## 2025-07-11 PROCEDURE — 84153 ASSAY OF PSA TOTAL: CPT

## 2025-07-11 PROCEDURE — 81001 URINALYSIS AUTO W/SCOPE: CPT

## 2025-07-11 PROCEDURE — 85025 COMPLETE CBC W/AUTO DIFF WBC: CPT

## 2025-07-11 PROCEDURE — 80061 LIPID PANEL: CPT

## 2025-07-11 PROCEDURE — 80053 COMPREHEN METABOLIC PANEL: CPT

## 2025-07-11 PROCEDURE — 83036 HEMOGLOBIN GLYCOSYLATED A1C: CPT

## 2025-07-11 PROCEDURE — 36415 COLL VENOUS BLD VENIPUNCTURE: CPT

## 2025-07-11 PROCEDURE — 84443 ASSAY THYROID STIM HORMONE: CPT

## 2025-07-14 ENCOUNTER — OFFICE VISIT (OUTPATIENT)
Dept: FAMILY MEDICINE | Facility: CLINIC | Age: 60
End: 2025-07-14
Payer: COMMERCIAL

## 2025-07-14 ENCOUNTER — RESULTS FOLLOW-UP (OUTPATIENT)
Dept: FAMILY MEDICINE | Facility: CLINIC | Age: 60
End: 2025-07-14

## 2025-07-14 ENCOUNTER — TELEPHONE (OUTPATIENT)
Dept: FAMILY MEDICINE | Facility: CLINIC | Age: 60
End: 2025-07-14

## 2025-07-14 VITALS
SYSTOLIC BLOOD PRESSURE: 158 MMHG | HEIGHT: 66 IN | RESPIRATION RATE: 18 BRPM | BODY MASS INDEX: 36.72 KG/M2 | OXYGEN SATURATION: 97 % | DIASTOLIC BLOOD PRESSURE: 88 MMHG | WEIGHT: 228.5 LBS | TEMPERATURE: 98 F | HEART RATE: 70 BPM

## 2025-07-14 DIAGNOSIS — N18.31 CKD STAGE 3A, GFR 45-59 ML/MIN: ICD-10-CM

## 2025-07-14 DIAGNOSIS — D64.9 ANEMIA, UNSPECIFIED TYPE: Primary | ICD-10-CM

## 2025-07-14 DIAGNOSIS — I10 PRIMARY HYPERTENSION: ICD-10-CM

## 2025-07-14 RX ORDER — CARVEDILOL 12.5 MG/1
12.5 TABLET ORAL 2 TIMES DAILY WITH MEALS
Qty: 180 TABLET | Refills: 3 | Status: SHIPPED | OUTPATIENT
Start: 2025-07-14 | End: 2026-07-14

## 2025-07-14 NOTE — ASSESSMENT & PLAN NOTE
Still elevated today.  Stop labetalol.  Start carvedilol 12.5 b.I.d..  Return 2 weeks for a nurse BP check.  If abnormal, increase to carvedilol 25 mg b.I.d.

## 2025-07-14 NOTE — PROGRESS NOTES
Family Medicine    Patient ID: [id number]     Chief Complaint: Follow-up (Annual Wellness Exam with labs)      HPI:     Elan Lentz Jr. is a 59 y.o. male here today for  follow up:     Hypertension:  Still elevated today.  Stop labetalol.  Start carvedilol 12.5 b.I.d..  Return 2 weeks for a nurse BP check.  If abnormal, increase to carvedilol 25 mg b.I.d.    CKD:  EGFR 55, slightly elevated creatinine.  Patient admits to not drinking hardly any fluids.  Discussed need for hydration.  Repeat three-month.    Anemia:  Mild anemia:  Denies any hematochezia or melena.  Colorectal screening not up-to-date.  He has a Cologuard kit in his house and he will send in his sample.  Normal MCV.  We will add iron, B12, and folate to the labs to be repeated in three-month.     Declines all available vaccines.    MEDICAL HISTORY:       Past Medical History:   Diagnosis Date    Hypertension     Renal mass, right         Past Surgical History:   Procedure Laterality Date    APPENDECTOMY      NEPHRECTOMY Right 08/2024        Social History     Tobacco Use    Smoking status: Never    Smokeless tobacco: Never   Substance and Sexual Activity    Alcohol use: Yes     Alcohol/week: 3.0 standard drinks of alcohol     Types: 3 Cans of beer per week    Drug use: Not Currently    Sexual activity: Not on file        Current Outpatient Medications   Medication Instructions    acetaminophen (TYLENOL) 650 mg, Oral, Every 8 hours PRN    carvediloL (COREG) 12.5 mg, Oral, 2 times daily with meals    hydrALAZINE (APRESOLINE) 100 mg, Oral, Every 12 hours    lisinopriL (PRINIVIL,ZESTRIL) 20 mg, Oral, Daily       Review of patient's allergies indicates:  No Known Allergies     Patient Care Team:  Alba Holm MD as PCP - General (Family Medicine)     Subjective:     Review of Systems   Constitutional:  Negative for activity change, appetite change, fever and unexpected weight change.   HENT:  Negative for congestion, dental problem,  "rhinorrhea and trouble swallowing.    Eyes:  Negative for visual disturbance.   Respiratory:  Negative for chest tightness and shortness of breath.    Cardiovascular:  Negative for chest pain, palpitations and leg swelling.   Gastrointestinal:  Negative for abdominal pain, blood in stool, constipation, diarrhea, nausea and vomiting.   Genitourinary:  Negative for difficulty urinating.   Musculoskeletal:  Negative for gait problem.   Skin:  Negative for rash and wound.   Neurological:  Negative for dizziness, syncope, speech difficulty, weakness and light-headedness.   Psychiatric/Behavioral:  Negative for confusion and suicidal ideas.        12 point review of systems conducted, negative except as stated in the history of present illness. See HPI for details.    Objective:     Visit Vitals  BP (!) 158/88 (BP Location: Left arm, Patient Position: Sitting)   Pulse 70   Temp 98 °F (36.7 °C) (Oral)   Resp 18   Ht 5' 6" (1.676 m)   Wt 103.6 kg (228 lb 8 oz)   SpO2 97%   BMI 36.88 kg/m²       Physical Exam  Vitals and nursing note reviewed.   Constitutional:       General: He is not in acute distress.     Appearance: Normal appearance. He is not ill-appearing, toxic-appearing or diaphoretic.   HENT:      Head: Normocephalic and atraumatic.      Right Ear: Tympanic membrane, ear canal and external ear normal. There is no impacted cerumen.      Left Ear: Tympanic membrane, ear canal and external ear normal. There is no impacted cerumen.      Nose: No congestion or rhinorrhea.      Mouth/Throat:      Pharynx: Oropharynx is clear. No oropharyngeal exudate or posterior oropharyngeal erythema.   Eyes:      General:         Right eye: No discharge.         Left eye: No discharge.      Extraocular Movements: Extraocular movements intact.      Conjunctiva/sclera: Conjunctivae normal.   Cardiovascular:      Rate and Rhythm: Normal rate and regular rhythm.      Heart sounds: No murmur heard.     No friction rub. No gallop. "   Pulmonary:      Effort: Pulmonary effort is normal. No respiratory distress.      Breath sounds: Normal breath sounds.   Musculoskeletal:      Right lower leg: No edema.      Left lower leg: No edema.   Skin:     Capillary Refill: Capillary refill takes less than 2 seconds.   Neurological:      Mental Status: He is alert and oriented to person, place, and time.   Psychiatric:         Mood and Affect: Mood normal.         Behavior: Behavior normal.         Thought Content: Thought content normal.         Recent labs:     Chemistry:  Lab Results   Component Value Date     (L) 07/11/2025    K 4.7 07/11/2025    BUN 17.1 07/11/2025    CREATININE 1.46 (H) 07/11/2025    EGFRNORACEVR 55 07/11/2025    CALCIUM 9.1 07/11/2025    ALKPHOS 43 07/11/2025    ALBUMIN 3.9 07/11/2025    AST 27 07/11/2025    ALT 24 07/11/2025    MG 2.50 05/20/2024    PHOS 4.7 05/20/2024    TSH 4.233 07/11/2025    PSA 0.72 07/11/2025        Lab Results   Component Value Date    HGBA1C 5.0 07/11/2025        Hematology:  Lab Results   Component Value Date    WBC 9.18 07/11/2025    HGB 11.0 (L) 07/11/2025    HCT 32.0 (L) 07/11/2025     07/11/2025       Lipid Panel:  Lab Results   Component Value Date    CHOL 160 07/11/2025    HDL 47 07/11/2025    LDL 74.00 07/11/2025    TRIG 193 (H) 07/11/2025    TOTALCHOLEST 3 07/11/2025        Urine:  Lab Results   Component Value Date    APPEARANCEUA Clear 07/11/2025    SGUA 1.012 07/11/2025    PROTEINUA Negative 07/11/2025    KETONESUA Negative 07/11/2025    LEUKOCYTESUR Negative 07/11/2025    RBCUA 0-5 07/11/2025    WBCUA 0-5 07/11/2025    BACTERIA None Seen 07/11/2025    SQEPUA Trace 07/11/2025    CREATRANDUR 36.1 (L) 07/08/2024        Assessment:       ICD-10-CM ICD-9-CM   1. Anemia, unspecified type  D64.9 285.9   2. CKD stage 3a, GFR 45-59 ml/min  N18.31 585.3   3. Primary hypertension  I10 401.9        Plan:     1. Anemia, unspecified type  Overview:  Mild anemia:  Denies any hematochezia or  melena.  Colorectal screening not up-to-date.  He has a Cologuard kit in his house and he will send in his sample.  Normal MCV.  We will add iron, B12, and folate to the labs to be repeated in three-month.    Orders:  -     Vitamin B12; Future; Expected date: 10/14/2025  -     Folate; Future; Expected date: 10/14/2025  -     Ferritin; Future; Expected date: 10/14/2025  -     Iron and TIBC; Future; Expected date: 10/14/2025    2. CKD stage 3a, GFR 45-59 ml/min  Overview:  EGFR 55, slightly elevated creatinine.  Patient admits to not drinking hardly any fluids.  Discussed need for hydration.  Repeat three-month.  Avoid NSAIDS (aleve, motrin, naproxen, mobic, ibuprofen). It is ok to take in small doses on occasion, but check with your doctor first  Increase water intake to 80 oz of water daily  Adhere to a low protein diet  Alcohol cessation encouraged    Orders:  -     Microalbumin/Creatinine Ratio, Urine; Future; Expected date: 10/14/2025  -     Comprehensive Metabolic Panel; Future; Expected date: 10/14/2025    3. Primary hypertension  Overview:  Labetalol 100 mg b.i.d.   Hydralazine 100 mg b.i.d. (2 x 50 b.i.d.)  Lisinopril 20 mg daily    2024 nephrectomy  CKD 3A    Assessment & Plan:   Still elevated today.  Stop labetalol.  Start carvedilol 12.5 b.I.d..  Return 2 weeks for a nurse BP check.  If abnormal, increase to carvedilol 25 mg b.I.d.    Orders:  -     carvediloL (COREG) 12.5 MG tablet; Take 1 tablet (12.5 mg total) by mouth 2 (two) times daily with meals.  Dispense: 180 tablet; Refill: 3         No follow-ups on file. In addition to their scheduled follow up, the patient has also been instructed to follow up on as needed basis.     Future Appointments   Date Time Provider Department Center   7/30/2025 11:00 AM [place], [facility] [place]   10/13/2025  3:00 PM Davey Nixon MD [facility] [place]        Davye Nixon MD

## 2025-07-30 ENCOUNTER — CLINICAL SUPPORT (OUTPATIENT)
Dept: FAMILY MEDICINE | Facility: CLINIC | Age: 60
End: 2025-07-30
Payer: COMMERCIAL

## 2025-07-30 VITALS — OXYGEN SATURATION: 98 % | HEART RATE: 73 BPM | DIASTOLIC BLOOD PRESSURE: 90 MMHG | SYSTOLIC BLOOD PRESSURE: 160 MMHG

## 2025-07-30 DIAGNOSIS — I10 PRIMARY HYPERTENSION: Primary | ICD-10-CM

## 2025-07-30 NOTE — PROGRESS NOTES
Blood Pressure 1st check 168/88, 2nd check 160/90.  Pt stated no signs or symptoms of distress.  Dr. Escobedo notified, pt instructions to monitor and document bp at home, continue medications and f/u 2 weeks with bp record.  Sooner if bp is consistently elevated.  Pt verbalized understanding.  Appt made

## 2025-08-20 ENCOUNTER — OFFICE VISIT (OUTPATIENT)
Dept: FAMILY MEDICINE | Facility: CLINIC | Age: 60
End: 2025-08-20
Payer: COMMERCIAL

## 2025-08-20 VITALS
OXYGEN SATURATION: 98 % | HEIGHT: 66 IN | RESPIRATION RATE: 18 BRPM | WEIGHT: 226.81 LBS | BODY MASS INDEX: 36.45 KG/M2 | SYSTOLIC BLOOD PRESSURE: 150 MMHG | TEMPERATURE: 98 F | DIASTOLIC BLOOD PRESSURE: 90 MMHG | HEART RATE: 66 BPM

## 2025-08-20 DIAGNOSIS — I10 PRIMARY HYPERTENSION: ICD-10-CM

## 2025-08-20 PROCEDURE — 99213 OFFICE O/P EST LOW 20 MIN: CPT | Mod: ,,,

## 2025-08-20 PROCEDURE — 3080F DIAST BP >= 90 MM HG: CPT | Mod: CPTII,,,

## 2025-08-20 PROCEDURE — 1160F RVW MEDS BY RX/DR IN RCRD: CPT | Mod: CPTII,,,

## 2025-08-20 PROCEDURE — 3044F HG A1C LEVEL LT 7.0%: CPT | Mod: CPTII,,,

## 2025-08-20 PROCEDURE — 1159F MED LIST DOCD IN RCRD: CPT | Mod: CPTII,,,

## 2025-08-20 PROCEDURE — 4010F ACE/ARB THERAPY RXD/TAKEN: CPT | Mod: CPTII,,,

## 2025-08-20 PROCEDURE — 3077F SYST BP >= 140 MM HG: CPT | Mod: CPTII,,,

## 2025-08-20 PROCEDURE — 3008F BODY MASS INDEX DOCD: CPT | Mod: CPTII,,,

## 2025-08-20 PROCEDURE — G2211 COMPLEX E/M VISIT ADD ON: HCPCS | Mod: ,,,

## 2025-08-20 RX ORDER — CARVEDILOL 12.5 MG/1
12.5 TABLET ORAL 2 TIMES DAILY WITH MEALS
Qty: 180 TABLET | Refills: 3 | Status: SHIPPED | OUTPATIENT
Start: 2025-08-20 | End: 2026-08-20

## 2025-08-29 ENCOUNTER — TELEPHONE (OUTPATIENT)
Dept: FAMILY MEDICINE | Facility: CLINIC | Age: 60
End: 2025-08-29
Payer: COMMERCIAL

## 2025-08-29 DIAGNOSIS — I10 PRIMARY HYPERTENSION: ICD-10-CM

## 2025-08-29 RX ORDER — LISINOPRIL 20 MG/1
20 TABLET ORAL DAILY
Qty: 90 TABLET | Refills: 0 | Status: SHIPPED | OUTPATIENT
Start: 2025-08-29 | End: 2026-08-29

## 2025-09-02 ENCOUNTER — TELEPHONE (OUTPATIENT)
Dept: FAMILY MEDICINE | Facility: CLINIC | Age: 60
End: 2025-09-02
Payer: COMMERCIAL

## 2025-09-02 DIAGNOSIS — I10 PRIMARY HYPERTENSION: ICD-10-CM

## 2025-09-02 RX ORDER — LISINOPRIL 20 MG/1
20 TABLET ORAL DAILY
Qty: 90 TABLET | Refills: 0 | Status: SHIPPED | OUTPATIENT
Start: 2025-09-02 | End: 2026-09-02

## 2025-09-05 ENCOUNTER — TELEPHONE (OUTPATIENT)
Dept: FAMILY MEDICINE | Facility: CLINIC | Age: 60
End: 2025-09-05
Payer: COMMERCIAL

## 2025-09-05 DIAGNOSIS — I10 PRIMARY HYPERTENSION: ICD-10-CM

## 2025-09-05 RX ORDER — HYDRALAZINE HYDROCHLORIDE 50 MG/1
100 TABLET, FILM COATED ORAL EVERY 12 HOURS
Qty: 360 TABLET | Refills: 0 | Status: SHIPPED | OUTPATIENT
Start: 2025-09-05 | End: 2026-09-05